# Patient Record
Sex: MALE | Race: WHITE | NOT HISPANIC OR LATINO | Employment: FULL TIME | RURAL
[De-identification: names, ages, dates, MRNs, and addresses within clinical notes are randomized per-mention and may not be internally consistent; named-entity substitution may affect disease eponyms.]

---

## 2020-09-30 ENCOUNTER — HISTORICAL (OUTPATIENT)
Dept: ADMINISTRATIVE | Facility: HOSPITAL | Age: 55
End: 2020-09-30

## 2020-10-01 LAB
LAB AP CLINICAL INFORMATION: NORMAL
LAB AP DIAGNOSIS - HISTORICAL: NORMAL
LAB AP GROSS PATHOLOGY - HISTORICAL: NORMAL
LAB AP SPECIMEN SUBMITTED - HISTORICAL: NORMAL

## 2020-10-02 LAB
INSULIN SERPL-ACNC: NORMAL U[IU]/ML

## 2020-11-18 ENCOUNTER — HISTORICAL (OUTPATIENT)
Dept: ADMINISTRATIVE | Facility: HOSPITAL | Age: 55
End: 2020-11-18

## 2021-05-07 ENCOUNTER — OFFICE VISIT (OUTPATIENT)
Dept: FAMILY MEDICINE | Facility: CLINIC | Age: 56
End: 2021-05-07
Payer: COMMERCIAL

## 2021-05-07 VITALS
TEMPERATURE: 99 F | SYSTOLIC BLOOD PRESSURE: 138 MMHG | RESPIRATION RATE: 20 BRPM | DIASTOLIC BLOOD PRESSURE: 98 MMHG | WEIGHT: 230.38 LBS | OXYGEN SATURATION: 98 % | BODY MASS INDEX: 32.25 KG/M2 | HEART RATE: 75 BPM | HEIGHT: 71 IN

## 2021-05-07 DIAGNOSIS — E78.5 DYSLIPIDEMIA: ICD-10-CM

## 2021-05-07 DIAGNOSIS — I10 ESSENTIAL HYPERTENSION: Primary | ICD-10-CM

## 2021-05-07 PROCEDURE — 99213 OFFICE O/P EST LOW 20 MIN: CPT | Mod: ,,, | Performed by: NURSE PRACTITIONER

## 2021-05-07 PROCEDURE — 99213 PR OFFICE/OUTPT VISIT, EST, LEVL III, 20-29 MIN: ICD-10-PCS | Mod: ,,, | Performed by: NURSE PRACTITIONER

## 2021-05-07 RX ORDER — ATORVASTATIN CALCIUM 10 MG/1
TABLET, FILM COATED ORAL
COMMUNITY
Start: 2021-03-26 | End: 2021-05-07 | Stop reason: SDUPTHER

## 2021-05-07 RX ORDER — CYCLOBENZAPRINE HCL 10 MG
10 TABLET ORAL 3 TIMES DAILY PRN
Qty: 90 TABLET | Refills: 3 | Status: SHIPPED | OUTPATIENT
Start: 2021-05-07 | End: 2021-09-08 | Stop reason: SDUPTHER

## 2021-05-07 RX ORDER — IBUPROFEN 800 MG/1
800 TABLET ORAL 3 TIMES DAILY
Qty: 60 TABLET | Refills: 3 | Status: SHIPPED | OUTPATIENT
Start: 2021-05-07 | End: 2021-09-08 | Stop reason: SDUPTHER

## 2021-05-07 RX ORDER — ATORVASTATIN CALCIUM 10 MG/1
TABLET, FILM COATED ORAL
Qty: 90 TABLET | Refills: 1 | Status: SHIPPED | OUTPATIENT
Start: 2021-05-07 | End: 2021-09-08 | Stop reason: SDUPTHER

## 2021-05-07 RX ORDER — LISINOPRIL AND HYDROCHLOROTHIAZIDE 12.5; 2 MG/1; MG/1
1 TABLET ORAL DAILY
COMMUNITY
Start: 2021-03-24 | End: 2021-05-07 | Stop reason: SDUPTHER

## 2021-05-07 RX ORDER — LISINOPRIL AND HYDROCHLOROTHIAZIDE 12.5; 2 MG/1; MG/1
1 TABLET ORAL DAILY
Qty: 90 TABLET | Refills: 1 | Status: SHIPPED | OUTPATIENT
Start: 2021-05-07 | End: 2021-09-08 | Stop reason: SDUPTHER

## 2021-05-07 RX ORDER — IBUPROFEN 800 MG/1
800 TABLET ORAL 3 TIMES DAILY
COMMUNITY
End: 2021-05-07 | Stop reason: SDUPTHER

## 2021-05-07 RX ORDER — CYCLOBENZAPRINE HCL 10 MG
10 TABLET ORAL 3 TIMES DAILY PRN
COMMUNITY
End: 2021-05-07 | Stop reason: SDUPTHER

## 2021-05-11 ENCOUNTER — TELEPHONE (OUTPATIENT)
Dept: FAMILY MEDICINE | Facility: CLINIC | Age: 56
End: 2021-05-11

## 2021-07-01 ENCOUNTER — PATIENT MESSAGE (OUTPATIENT)
Dept: ADMINISTRATIVE | Facility: OTHER | Age: 56
End: 2021-07-01

## 2021-09-08 ENCOUNTER — OFFICE VISIT (OUTPATIENT)
Dept: FAMILY MEDICINE | Facility: CLINIC | Age: 56
End: 2021-09-08
Payer: COMMERCIAL

## 2021-09-08 VITALS
WEIGHT: 230.63 LBS | OXYGEN SATURATION: 99 % | HEART RATE: 100 BPM | RESPIRATION RATE: 20 BRPM | TEMPERATURE: 98 F | DIASTOLIC BLOOD PRESSURE: 76 MMHG | BODY MASS INDEX: 32.29 KG/M2 | SYSTOLIC BLOOD PRESSURE: 128 MMHG | HEIGHT: 71 IN

## 2021-09-08 DIAGNOSIS — E78.2 MIXED HYPERLIPIDEMIA: ICD-10-CM

## 2021-09-08 DIAGNOSIS — E66.09 CLASS 1 OBESITY DUE TO EXCESS CALORIES WITH BODY MASS INDEX (BMI) OF 32.0 TO 32.9 IN ADULT, UNSPECIFIED WHETHER SERIOUS COMORBIDITY PRESENT: ICD-10-CM

## 2021-09-08 DIAGNOSIS — I10 ESSENTIAL HYPERTENSION: Primary | ICD-10-CM

## 2021-09-08 DIAGNOSIS — F17.210 NICOTINE DEPENDENCE, CIGARETTES, UNCOMPLICATED: ICD-10-CM

## 2021-09-08 PROBLEM — E66.811 CLASS 1 OBESITY DUE TO EXCESS CALORIES WITH BODY MASS INDEX (BMI) OF 32.0 TO 32.9 IN ADULT: Status: ACTIVE | Noted: 2021-09-08

## 2021-09-08 PROBLEM — Z86.0100 HISTORY OF COLON POLYPS: Status: ACTIVE | Noted: 2020-09-30

## 2021-09-08 PROBLEM — Z86.010 HISTORY OF COLON POLYPS: Status: ACTIVE | Noted: 2020-09-30

## 2021-09-08 PROCEDURE — 99214 PR OFFICE/OUTPT VISIT, EST, LEVL IV, 30-39 MIN: ICD-10-PCS | Mod: 25,,, | Performed by: NURSE PRACTITIONER

## 2021-09-08 PROCEDURE — 90471 PNEUMOCOCCAL POLYSACCHARIDE VACCINE 23-VALENT =>2YO SQ IM: ICD-10-PCS | Mod: ,,, | Performed by: NURSE PRACTITIONER

## 2021-09-08 PROCEDURE — 90732 PPSV23 VACC 2 YRS+ SUBQ/IM: CPT | Mod: ,,, | Performed by: NURSE PRACTITIONER

## 2021-09-08 PROCEDURE — 90471 IMMUNIZATION ADMIN: CPT | Mod: ,,, | Performed by: NURSE PRACTITIONER

## 2021-09-08 PROCEDURE — 99214 OFFICE O/P EST MOD 30 MIN: CPT | Mod: 25,,, | Performed by: NURSE PRACTITIONER

## 2021-09-08 PROCEDURE — 90732 PNEUMOCOCCAL POLYSACCHARIDE VACCINE 23-VALENT =>2YO SQ IM: ICD-10-PCS | Mod: ,,, | Performed by: NURSE PRACTITIONER

## 2021-09-08 RX ORDER — CYCLOBENZAPRINE HCL 10 MG
10 TABLET ORAL 3 TIMES DAILY PRN
Qty: 90 TABLET | Refills: 3 | Status: SHIPPED | OUTPATIENT
Start: 2021-09-08 | End: 2022-04-20 | Stop reason: SDUPTHER

## 2021-09-08 RX ORDER — PHENTERMINE HYDROCHLORIDE 37.5 MG/1
37.5 TABLET ORAL
Qty: 30 TABLET | Refills: 0 | Status: SHIPPED | OUTPATIENT
Start: 2021-09-08 | End: 2021-10-08

## 2021-09-08 RX ORDER — ATORVASTATIN CALCIUM 10 MG/1
TABLET, FILM COATED ORAL
Qty: 90 TABLET | Refills: 1 | Status: SHIPPED | OUTPATIENT
Start: 2021-09-08 | End: 2022-03-29 | Stop reason: SDUPTHER

## 2021-09-08 RX ORDER — LISINOPRIL AND HYDROCHLOROTHIAZIDE 12.5; 2 MG/1; MG/1
1 TABLET ORAL DAILY
Qty: 90 TABLET | Refills: 1 | Status: SHIPPED | OUTPATIENT
Start: 2021-09-08 | End: 2022-03-29 | Stop reason: SDUPTHER

## 2021-09-08 RX ORDER — IBUPROFEN 800 MG/1
800 TABLET ORAL 3 TIMES DAILY PRN
Qty: 90 TABLET | Refills: 3 | Status: SHIPPED | OUTPATIENT
Start: 2021-09-08 | End: 2022-04-20 | Stop reason: SDUPTHER

## 2021-10-09 ENCOUNTER — OFFICE VISIT (OUTPATIENT)
Dept: FAMILY MEDICINE | Facility: CLINIC | Age: 56
End: 2021-10-09
Payer: COMMERCIAL

## 2021-10-09 VITALS
HEART RATE: 98 BPM | BODY MASS INDEX: 31.66 KG/M2 | WEIGHT: 227 LBS | DIASTOLIC BLOOD PRESSURE: 80 MMHG | SYSTOLIC BLOOD PRESSURE: 130 MMHG | RESPIRATION RATE: 19 BRPM | TEMPERATURE: 98 F | OXYGEN SATURATION: 100 %

## 2021-10-09 DIAGNOSIS — B30.8 CHRONIC VIRAL CONJUNCTIVITIS OF LEFT EYE: Primary | ICD-10-CM

## 2021-10-09 PROCEDURE — 99051 PR MEDICAL SERVICES, EVE/WKEND/HOLIDAY: ICD-10-PCS | Mod: ,,, | Performed by: FAMILY MEDICINE

## 2021-10-09 PROCEDURE — 99213 PR OFFICE/OUTPT VISIT, EST, LEVL III, 20-29 MIN: ICD-10-PCS | Mod: ,,, | Performed by: FAMILY MEDICINE

## 2021-10-09 PROCEDURE — 99051 MED SERV EVE/WKEND/HOLIDAY: CPT | Mod: ,,, | Performed by: FAMILY MEDICINE

## 2021-10-09 PROCEDURE — 99213 OFFICE O/P EST LOW 20 MIN: CPT | Mod: ,,, | Performed by: FAMILY MEDICINE

## 2021-10-09 RX ORDER — ERYTHROMYCIN 5 MG/G
OINTMENT OPHTHALMIC EVERY 6 HOURS
Qty: 3.5 G | Refills: 1 | Status: SHIPPED | OUTPATIENT
Start: 2021-10-09 | End: 2022-03-29

## 2021-10-14 ENCOUNTER — OFFICE VISIT (OUTPATIENT)
Dept: FAMILY MEDICINE | Facility: CLINIC | Age: 56
End: 2021-10-14
Payer: COMMERCIAL

## 2021-10-14 VITALS
WEIGHT: 227.38 LBS | HEART RATE: 98 BPM | DIASTOLIC BLOOD PRESSURE: 86 MMHG | BODY MASS INDEX: 31.83 KG/M2 | OXYGEN SATURATION: 99 % | HEIGHT: 71 IN | TEMPERATURE: 98 F | SYSTOLIC BLOOD PRESSURE: 132 MMHG | RESPIRATION RATE: 20 BRPM

## 2021-10-14 DIAGNOSIS — Z79.899 OTHER LONG TERM (CURRENT) DRUG THERAPY: Primary | ICD-10-CM

## 2021-10-14 DIAGNOSIS — Z12.5 SCREENING FOR MALIGNANT NEOPLASM OF PROSTATE: ICD-10-CM

## 2021-10-14 DIAGNOSIS — I10 ESSENTIAL HYPERTENSION: ICD-10-CM

## 2021-10-14 DIAGNOSIS — Z11.59 NEED FOR HEPATITIS C SCREENING TEST: ICD-10-CM

## 2021-10-14 DIAGNOSIS — F17.210 NICOTINE DEPENDENCE, CIGARETTES, UNCOMPLICATED: ICD-10-CM

## 2021-10-14 DIAGNOSIS — E78.2 MIXED HYPERLIPIDEMIA: ICD-10-CM

## 2021-10-14 DIAGNOSIS — E66.09 CLASS 1 OBESITY DUE TO EXCESS CALORIES WITH SERIOUS COMORBIDITY AND BODY MASS INDEX (BMI) OF 32.0 TO 32.9 IN ADULT: Primary | ICD-10-CM

## 2021-10-14 DIAGNOSIS — H10.9 CONJUNCTIVITIS OF LEFT EYE, UNSPECIFIED CONJUNCTIVITIS TYPE: ICD-10-CM

## 2021-10-14 DIAGNOSIS — I10 PRIMARY HYPERTENSION: ICD-10-CM

## 2021-10-14 PROCEDURE — 99213 OFFICE O/P EST LOW 20 MIN: CPT | Mod: ,,, | Performed by: NURSE PRACTITIONER

## 2021-10-14 PROCEDURE — 99213 PR OFFICE/OUTPT VISIT, EST, LEVL III, 20-29 MIN: ICD-10-PCS | Mod: ,,, | Performed by: NURSE PRACTITIONER

## 2021-10-14 RX ORDER — PHENTERMINE HYDROCHLORIDE 37.5 MG/1
37.5 TABLET ORAL
Qty: 30 TABLET | Refills: 0 | Status: SHIPPED | OUTPATIENT
Start: 2021-10-14 | End: 2021-11-13

## 2021-11-17 ENCOUNTER — HOSPITAL ENCOUNTER (OUTPATIENT)
Dept: RADIOLOGY | Facility: HOSPITAL | Age: 56
Discharge: HOME | End: 2021-11-17
Attending: UROLOGY
Payer: COMMERCIAL

## 2021-11-17 ENCOUNTER — OFFICE VISIT (OUTPATIENT)
Dept: UROLOGY | Facility: CLINIC | Age: 56
End: 2021-11-17
Payer: COMMERCIAL

## 2021-11-17 VITALS
BODY MASS INDEX: 30.2 KG/M2 | HEIGHT: 72 IN | WEIGHT: 223 LBS | DIASTOLIC BLOOD PRESSURE: 95 MMHG | SYSTOLIC BLOOD PRESSURE: 136 MMHG | HEART RATE: 118 BPM

## 2021-11-17 DIAGNOSIS — N20.0 KIDNEY STONES: Primary | ICD-10-CM

## 2021-11-17 DIAGNOSIS — Z12.5 SCREENING FOR PROSTATE CANCER: ICD-10-CM

## 2021-11-17 DIAGNOSIS — N20.0 KIDNEY STONE: Primary | ICD-10-CM

## 2021-11-17 DIAGNOSIS — N20.0 KIDNEY STONE: ICD-10-CM

## 2021-11-17 PROCEDURE — 74018 RADEX ABDOMEN 1 VIEW: CPT | Mod: 26,,, | Performed by: RADIOLOGY

## 2021-11-17 PROCEDURE — 74018 XR KUB: ICD-10-PCS | Mod: 26,,, | Performed by: RADIOLOGY

## 2021-11-17 PROCEDURE — 99213 PR OFFICE/OUTPT VISIT, EST, LEVL III, 20-29 MIN: ICD-10-PCS | Mod: S$PBB,,, | Performed by: UROLOGY

## 2021-11-17 PROCEDURE — 99213 OFFICE O/P EST LOW 20 MIN: CPT | Mod: S$PBB,,, | Performed by: UROLOGY

## 2021-11-17 PROCEDURE — 99214 OFFICE O/P EST MOD 30 MIN: CPT | Mod: PBBFAC | Performed by: UROLOGY

## 2021-11-17 PROCEDURE — 74018 RADEX ABDOMEN 1 VIEW: CPT | Mod: TC

## 2021-12-10 ENCOUNTER — OFFICE VISIT (OUTPATIENT)
Dept: FAMILY MEDICINE | Facility: CLINIC | Age: 56
End: 2021-12-10
Payer: COMMERCIAL

## 2021-12-10 VITALS
TEMPERATURE: 98 F | RESPIRATION RATE: 20 BRPM | BODY MASS INDEX: 30.88 KG/M2 | DIASTOLIC BLOOD PRESSURE: 94 MMHG | HEIGHT: 72 IN | HEART RATE: 96 BPM | OXYGEN SATURATION: 97 % | SYSTOLIC BLOOD PRESSURE: 160 MMHG | WEIGHT: 228 LBS

## 2021-12-10 DIAGNOSIS — J01.00 ACUTE NON-RECURRENT MAXILLARY SINUSITIS: Primary | ICD-10-CM

## 2021-12-10 DIAGNOSIS — R53.83 FATIGUE, UNSPECIFIED TYPE: ICD-10-CM

## 2021-12-10 PROCEDURE — 96372 THER/PROPH/DIAG INJ SC/IM: CPT | Mod: ,,, | Performed by: NURSE PRACTITIONER

## 2021-12-10 PROCEDURE — 96372 PR INJECTION,THERAP/PROPH/DIAG2ST, IM OR SUBCUT: ICD-10-PCS | Mod: ,,, | Performed by: NURSE PRACTITIONER

## 2021-12-10 PROCEDURE — 99203 OFFICE O/P NEW LOW 30 MIN: CPT | Mod: 25,,, | Performed by: NURSE PRACTITIONER

## 2021-12-10 PROCEDURE — 99203 PR OFFICE/OUTPT VISIT, NEW, LEVL III, 30-44 MIN: ICD-10-PCS | Mod: 25,,, | Performed by: NURSE PRACTITIONER

## 2021-12-10 RX ORDER — DEXAMETHASONE SODIUM PHOSPHATE 4 MG/ML
8 INJECTION, SOLUTION INTRA-ARTICULAR; INTRALESIONAL; INTRAMUSCULAR; INTRAVENOUS; SOFT TISSUE ONCE
Status: COMPLETED | OUTPATIENT
Start: 2021-12-10 | End: 2021-12-10

## 2021-12-10 RX ORDER — CEFDINIR 300 MG/1
300 CAPSULE ORAL 2 TIMES DAILY
Qty: 20 CAPSULE | Refills: 0 | Status: SHIPPED | OUTPATIENT
Start: 2021-12-10 | End: 2021-12-20

## 2021-12-10 RX ORDER — PREDNISONE 20 MG/1
20 TABLET ORAL DAILY
Qty: 5 TABLET | Refills: 0 | Status: SHIPPED | OUTPATIENT
Start: 2021-12-10 | End: 2022-03-29 | Stop reason: ALTCHOICE

## 2021-12-10 RX ORDER — CYANOCOBALAMIN 1000 UG/ML
1000 INJECTION, SOLUTION INTRAMUSCULAR; SUBCUTANEOUS
Status: COMPLETED | OUTPATIENT
Start: 2021-12-10 | End: 2021-12-10

## 2021-12-10 RX ORDER — CEFTRIAXONE 1 G/1
1 INJECTION, POWDER, FOR SOLUTION INTRAMUSCULAR; INTRAVENOUS
Status: COMPLETED | OUTPATIENT
Start: 2021-12-10 | End: 2021-12-10

## 2021-12-10 RX ADMIN — CYANOCOBALAMIN 1000 MCG: 1000 INJECTION, SOLUTION INTRAMUSCULAR; SUBCUTANEOUS at 12:12

## 2021-12-10 RX ADMIN — DEXAMETHASONE SODIUM PHOSPHATE 8 MG: 4 INJECTION, SOLUTION INTRA-ARTICULAR; INTRALESIONAL; INTRAMUSCULAR; INTRAVENOUS; SOFT TISSUE at 12:12

## 2021-12-10 RX ADMIN — CEFTRIAXONE 1 G: 1 INJECTION, POWDER, FOR SOLUTION INTRAMUSCULAR; INTRAVENOUS at 12:12

## 2022-03-14 PROBLEM — J01.00 ACUTE NON-RECURRENT MAXILLARY SINUSITIS: Status: RESOLVED | Noted: 2021-12-10 | Resolved: 2022-03-14

## 2022-03-29 ENCOUNTER — TELEPHONE (OUTPATIENT)
Dept: FAMILY MEDICINE | Facility: CLINIC | Age: 57
End: 2022-03-29
Payer: COMMERCIAL

## 2022-03-29 DIAGNOSIS — E78.2 MIXED HYPERLIPIDEMIA: ICD-10-CM

## 2022-03-29 DIAGNOSIS — I10 ESSENTIAL HYPERTENSION: ICD-10-CM

## 2022-03-29 RX ORDER — ATORVASTATIN CALCIUM 10 MG/1
10 TABLET, FILM COATED ORAL NIGHTLY
Qty: 90 TABLET | Refills: 0 | Status: SHIPPED | OUTPATIENT
Start: 2022-03-29 | End: 2022-04-20 | Stop reason: SDUPTHER

## 2022-03-29 RX ORDER — LISINOPRIL AND HYDROCHLOROTHIAZIDE 12.5; 2 MG/1; MG/1
1 TABLET ORAL DAILY
Qty: 90 TABLET | Refills: 0 | Status: SHIPPED | OUTPATIENT
Start: 2022-03-29 | End: 2022-04-20 | Stop reason: SDUPTHER

## 2022-03-29 NOTE — TELEPHONE ENCOUNTER
----- Message from La Guzman sent at 3/29/2022  7:43 AM CDT -----  Patient needs a refill on blood pressure and cholesterol called into [facility]. Please call patient  if you have any questions. Thanks!

## 2022-03-29 NOTE — TELEPHONE ENCOUNTER
Let pt know 90 day supply of meds were sent but his appt needs to be moved up so that he does not run out before we see him.

## 2022-03-29 NOTE — TELEPHONE ENCOUNTER
Yes I told her to schedule him in about 2 mths. We are only giving him a 90 day rx so that will only last until the end of June.

## 2022-04-04 ENCOUNTER — CLINICAL SUPPORT (OUTPATIENT)
Dept: PRIMARY CARE CLINIC | Facility: CLINIC | Age: 57
End: 2022-04-04

## 2022-04-04 DIAGNOSIS — Z01.10 ENCOUNTER FOR HEARING EXAMINATION, UNSPECIFIED WHETHER ABNORMAL FINDINGS: Primary | ICD-10-CM

## 2022-04-04 PROCEDURE — 92552 PR PURE TONE AUDIOMETRY, AIR: ICD-10-PCS | Mod: ,,, | Performed by: NURSE PRACTITIONER

## 2022-04-04 PROCEDURE — 92552 PURE TONE AUDIOMETRY AIR: CPT | Mod: ,,, | Performed by: NURSE PRACTITIONER

## 2022-04-04 NOTE — PROGRESS NOTES
Subjective:       Patient ID: Eric Gold is a 56 y.o. male.    Chief Complaint: No chief complaint on file.    HPI  Review of Systems      Objective:      Physical Exam    Assessment:       Problem List Items Addressed This Visit    None     Visit Diagnoses     Encounter for hearing examination, unspecified whether abnormal findings    -  Primary          Plan:       See scanned documents in .

## 2022-04-19 ENCOUNTER — CLINICAL SUPPORT (OUTPATIENT)
Dept: PRIMARY CARE CLINIC | Facility: CLINIC | Age: 57
End: 2022-04-19

## 2022-04-19 DIAGNOSIS — Z01.10 ENCOUNTER FOR HEARING EXAMINATION, UNSPECIFIED WHETHER ABNORMAL FINDINGS: Primary | ICD-10-CM

## 2022-04-19 PROCEDURE — 92552 PR PURE TONE AUDIOMETRY, AIR: ICD-10-PCS | Mod: ,,, | Performed by: NURSE PRACTITIONER

## 2022-04-19 PROCEDURE — 92552 PURE TONE AUDIOMETRY AIR: CPT | Mod: ,,, | Performed by: NURSE PRACTITIONER

## 2022-04-20 ENCOUNTER — OFFICE VISIT (OUTPATIENT)
Dept: FAMILY MEDICINE | Facility: CLINIC | Age: 57
End: 2022-04-20
Payer: COMMERCIAL

## 2022-04-20 VITALS
SYSTOLIC BLOOD PRESSURE: 130 MMHG | BODY MASS INDEX: 29.9 KG/M2 | RESPIRATION RATE: 20 BRPM | HEART RATE: 97 BPM | WEIGHT: 225.63 LBS | HEIGHT: 73 IN | OXYGEN SATURATION: 98 % | TEMPERATURE: 98 F | DIASTOLIC BLOOD PRESSURE: 78 MMHG

## 2022-04-20 DIAGNOSIS — I10 ESSENTIAL HYPERTENSION: Primary | ICD-10-CM

## 2022-04-20 DIAGNOSIS — E78.2 MIXED HYPERLIPIDEMIA: ICD-10-CM

## 2022-04-20 DIAGNOSIS — R53.83 OTHER FATIGUE: ICD-10-CM

## 2022-04-20 PROBLEM — E66.811 CLASS 1 OBESITY DUE TO EXCESS CALORIES WITH BODY MASS INDEX (BMI) OF 32.0 TO 32.9 IN ADULT: Status: RESOLVED | Noted: 2021-09-08 | Resolved: 2022-04-20

## 2022-04-20 PROBLEM — E66.09 CLASS 1 OBESITY DUE TO EXCESS CALORIES WITH BODY MASS INDEX (BMI) OF 32.0 TO 32.9 IN ADULT: Status: RESOLVED | Noted: 2021-09-08 | Resolved: 2022-04-20

## 2022-04-20 PROBLEM — H10.9 CONJUNCTIVITIS OF LEFT EYE: Status: RESOLVED | Noted: 2021-10-14 | Resolved: 2022-04-20

## 2022-04-20 PROCEDURE — 99213 OFFICE O/P EST LOW 20 MIN: CPT | Mod: 25,,, | Performed by: NURSE PRACTITIONER

## 2022-04-20 PROCEDURE — 99213 PR OFFICE/OUTPT VISIT, EST, LEVL III, 20-29 MIN: ICD-10-PCS | Mod: 25,,, | Performed by: NURSE PRACTITIONER

## 2022-04-20 PROCEDURE — 96372 THER/PROPH/DIAG INJ SC/IM: CPT | Mod: ,,, | Performed by: NURSE PRACTITIONER

## 2022-04-20 PROCEDURE — 96372 PR INJECTION,THERAP/PROPH/DIAG2ST, IM OR SUBCUT: ICD-10-PCS | Mod: ,,, | Performed by: NURSE PRACTITIONER

## 2022-04-20 RX ORDER — LISINOPRIL AND HYDROCHLOROTHIAZIDE 12.5; 2 MG/1; MG/1
1 TABLET ORAL DAILY
Qty: 90 TABLET | Refills: 1 | Status: SHIPPED | OUTPATIENT
Start: 2022-04-20 | End: 2022-10-27 | Stop reason: SDUPTHER

## 2022-04-20 RX ORDER — ATORVASTATIN CALCIUM 10 MG/1
10 TABLET, FILM COATED ORAL NIGHTLY
Qty: 90 TABLET | Refills: 3 | Status: SHIPPED | OUTPATIENT
Start: 2022-04-20 | End: 2022-10-27 | Stop reason: SDUPTHER

## 2022-04-20 RX ORDER — CYANOCOBALAMIN 1000 UG/ML
1000 INJECTION, SOLUTION INTRAMUSCULAR; SUBCUTANEOUS
Status: COMPLETED | OUTPATIENT
Start: 2022-04-20 | End: 2022-04-20

## 2022-04-20 RX ORDER — CYCLOBENZAPRINE HCL 10 MG
10 TABLET ORAL 3 TIMES DAILY PRN
Qty: 90 TABLET | Refills: 1 | Status: SHIPPED | OUTPATIENT
Start: 2022-04-20 | End: 2022-10-27 | Stop reason: SDUPTHER

## 2022-04-20 RX ORDER — PHENTERMINE HYDROCHLORIDE 37.5 MG/1
37.5 TABLET ORAL
Qty: 30 TABLET | Refills: 0 | Status: SHIPPED | OUTPATIENT
Start: 2022-04-20 | End: 2022-05-20

## 2022-04-20 RX ORDER — IBUPROFEN 800 MG/1
800 TABLET ORAL 3 TIMES DAILY PRN
Qty: 90 TABLET | Refills: 1 | Status: SHIPPED | OUTPATIENT
Start: 2022-04-20 | End: 2022-10-27 | Stop reason: SDUPTHER

## 2022-04-20 RX ADMIN — CYANOCOBALAMIN 1000 MCG: 1000 INJECTION, SOLUTION INTRAMUSCULAR; SUBCUTANEOUS at 11:04

## 2022-04-20 NOTE — PATIENT INSTRUCTIONS
Patient Education       Smoking: Not Just Harmful to Your Lungs and Heart   About this topic   Smoking is very common at any age. It is one of the leading causes of poor health and illness. Smoking can lead to death. It has many harmful chemicals that are released by the tobacco you smoke and inhale. Tobacco has many forms. These are:  Cigarettes  Pipes  Cigars  Chewing tobacco  Electronic cigarettes  Loose  Hookah  All kinds of tobaccos contain many toxic substances. These are what make you sick from smoking.  Nicotine ? The main thing that makes you addicted to smoking  Carbon monoxide ? A poison that gets into your blood when smoking  Tar ? Has chemicals that are cancerous  Lead and many others  These factors affect how much damage smoking will have on you:  How much you smoke  What you smoke  How what you smoke has been prepared  The content of what you smoke  Smoking hurts every part of the body. The lungs and the heart are most often affected by tobacco use.  General   Smoking is very harmful to your body. It can cause many illnesses and can affect how you look.  Smoking and Cancer   Smoking is the most common cause of lung cancer. Smoking may also increase the risk of:  Mouth cancer. This can also be caused by chewing tobacco.  Bladder cancer  Cancer of the tube from the mouth to stomach. This is also called the esophagus.  Cancer of the throat. This can also be caused by chewing tobacco.  Kidney cancer  Liver cancer  Stomach, colon, and rectal cancer  Cancer of the pancreas  Cancer of the cervix in women  Cancer of the blood or leukemia  Skin cancer  Smoking and Your Lungs   If you smoke you are more likely to have:  Breathing problems  Lung infections like pneumonia or bronchitis  Lung disease such as COPD or emphysema  Asthma  Smoking and the Heart and Circulation   Causes heart disease and stroke  Smokers are at a higher risk for high blood pressure  You are more likely to get blood clots  Smoking can  lower blood flow to the legs and skin  Smoking and Diabetes   If you smoke, you:  Have a higher risk of developing diabetes  May have higher blood sugar levels  May have more problems with your diabetes  Smoking and Digestion   Smokers make more stomach acid. This can cause sores or ulcers in your belly or bowel.  Your stomach acids may flow back to your esophagus. This is also called heartburn.  You have more chance of bowel irritation and swelling  Smoking and Your Bones   If you smoke:  Your bone-forming cells don't grow as fast  Your bones may become weaker (osteoporosis)  You may have more low back pain and arthritis  You may have more overuse injuries  You may have a greater risk of breaking bones  Your broken bones may take longer to heal  Smoking and Pregnancy   Makes your baby more prone to problems  You have a higher chance of having a premature baby or baby born early  Your healthy baby may die without reason. This is called sudden infant death syndrome.  Your baby may be born dead (stillbirth)  Your baby may have a low birth weight  You have a higher risk of an ectopic pregnancy or a pregnancy outside of the uterus  You have a higher chance of having a baby with mouth or facial defects  Smoking and Your Eyes, Hair, Hands, and Mouth   If you smoke, you may notice your:  Eyes become cloudy and form cataracts  Hair may get thin and turn gray sooner than it should  Fingernails turn yellow  Teeth become yellowish brown  Gums have more problems  Teeth have problems or even become loose  Sense of taste and smell start to go away  Breath smells bad  Smoking and Skin   Smoking causes:  Less blood flow to the skin  Wrinkles start early around your eyes and mouth  Dry skin  Your skin to lose elasticity and strength  Skin may get splotchy or pale  Your face to look thin and old. This is called smoker's face.  Greater risk of getting a skin problem called psoriasis  Slower healing of cuts or bruises  Smoking and Sex  Life   If you smoke you are more likely to have problems like:  Impotence  Decreased blood flow to the penis. This is also called erectile dysfunction.  Trouble getting pregnant  Early change of life or menopause for women  A higher risk of heart attack or stroke for women who smoke and use birth control pills  Damaged sperm that may lead to problems getting a woman pregnant, birth defects, or miscarriage  Smoking and Your Brain and Behavior   Smoking can:  Affect your mood  Lead to addiction  Cause long-term confusion or damage to your brain cells  Cause low mood  Smoking and Children   If you smoke, your children:  Will be more likely to smoke.  Can be sick from being around your smoke. This is called secondhand smoke.  Need to learn about the bad effects of smoking. Most smokers start before the age of 18. Encourage your children never to smoke.  Smoking and Other Effects   Smoking can cause:  Wounds to take longer to heal  You to eat poorly  Weight loss  Swelling in the body and affect the body's immune or defense system  Rheumatoid arthritis  Greater chance of injury  You to get warts easier (human papillomavirus)  A bad odor in hair and on clothes  You to have poor sports performance  You to spend a lot of money. Smoking is an expensive habit. A smoker who smokes a pack per day in the [place] will spend over $2000 per year on cigarettes.  Health care to cost more  You to miss work more often  Hearing loss  Even if you have smoked for many years, it is not too late to quit. Your body starts to heal as soon as you stop smoking. It is better to quit when you are young, but you can still get healthier if you quit at any age.       Helpful tips   Some helpful steps you can take to help you quit smoking:  Set a date to quit smoking.  Know the reasons that make you smoke more.  Know why you want to quit smoking.  Write down each time you smoke. Include the time and what you are doing. Plan ahead about what you will do  instead of smoking when that time or event reappears.  Tell your family and friends about your plan to quit smoking. Let them know how to help you.  Slowly reduce your smoking.  Remove smoking and tobacco products from your home and other places.  Avoid places and situations where you will more likely smoke. If people close to you smoke, ask them to quit with you. If they do not quit, ask them to not smoke around you.  Reward or treat yourself every time you do not smoke. Do not use food as a reward.  Ask a doctor for help.  Talk with your doctor before you try an electronic cigarette.  Where can I learn more?   Centers for Disease Control and Prevention  http://www.cdc.gov/tobacco/data_statistics/fact_sheets/health_effects/effects_cig_smoking/#overview   Centers for Disease Control and Prevention  https://www.cdc.gov/tobacco/campaign/tips/quit-smoking/guide/quit-plan.html?s_cid=OSH_tips_D9400   KidsHealth  http://Zero9.org/teen/drug_alcohol/tobacco/smoking.html#   US Food and Drug Administration  https://www.fda.gov/TobaccoProducts/Labeling/ProductsIngredientsComponents/default.htm   Last Reviewed Date   2021-03-31  Consumer Information Use and Disclaimer   This information is not specific medical advice and does not replace information you receive from your health care provider. This is only a brief summary of general information. It does NOT include all information about conditions, illnesses, injuries, tests, procedures, treatments, therapies, discharge instructions or life-style choices that may apply to you. You must talk with your health care provider for complete information about your health and treatment options. This information should not be used to decide whether or not to accept your health care providers advice, instructions or recommendations. Only your health care provider has the knowledge and training to provide advice that is right for you.  Copyright   Copyright © 2021 [employer] and  "its affiliates and/or licensors. All rights reserved.  Patient Education       Quitting Smoking   The Basics   Written by the doctors and editors at [facility]   What are the benefits of quitting smoking? -- Quitting smoking can dramatically improve your health and help you live longer. It lowers your risk of heart disease, lung disease, kidney failure, infection, and cancer.   Quitting smoking can also lower your chances of getting osteoporosis, a condition that makes your bones weak. Plus, it can help your skin look younger and reduce the chances that you will have problems with sex.  Quitting is not easy for most people, and it can take several tries to completely quit. But help and support are available. Quitting smoking will improve your health no matter how old you are, even if you have smoked for a long time.   What should I do if I want to quit smoking? -- It's a good idea to start by talking with your doctor or nurse. It is possible to quit on your own, without help. But getting help greatly increases your chances of quitting successfully.  When you are ready to quit, you will make a plan to:  Set a quit date  Tell your family and friends that you plan to quit  Plan ahead for the challenges you will face, such as cigarette cravings  Remove cigarettes from your home, car, and work  How can my doctor or nurse help? -- Your doctor or nurse can give you advice on the best way to quit. They can also give you medicines to:  Reduce your craving for cigarettes  Reduce your "withdrawal" symptoms (symptoms that happen when you stop smoking)  Your doctor or nurse can also help you find a counselor to talk to. For most people who are trying to quit smoking, it works best to use both medicines and counseling.  You can also get help from a free phone line (2-669-QUIT-NOW or 1-589.356.9275) or go online to www.smokefree.gov.  What are the symptoms of withdrawal? -- When you stop smoking, you might have symptoms such " as:  Trouble sleeping  Feeling irritable, anxious, or restless  Getting frustrated or angry  Having trouble thinking clearly  These symptoms can be hard to deal with, which is why it can be so hard to quit. But medicines can help.  Some people who stop smoking become temporarily depressed. Some people need treatment for depression, such as counseling or medicines or both. People with depression might:  No longer enjoy or care about doing the things they used to like to do  Feel sad, down, hopeless, nervous, or cranky most of the day, almost every day  Lose or gain weight  Sleep too much or too little  Feel tired or like they have no energy  Feel guilty or like they are worth nothing  Forget things or feel confused  Move and speak more slowly than usual  Act restless or have trouble staying still  Think about death or suicide  If you think you might be depressed, tell your doctor or nurse right away. They can talk to you about your symptoms and recommend treatment if needed.  If you ever feel like you might hurt yourself, go straight to the nearest emergency department or call for an ambulance (in the [place] and [place], dial 9-1-1). You can also call your doctor or nurse and tell them it is an emergency, or reach the  National Suicide Prevention Lifeline at 1-360.488.9008 or www.suicidepreventionlifeline.org.  How does counseling work? -- A counselor can help you figure out:  What triggers you to want to smoke, and how to handle these situations  How to resist cravings  What you can do differently if you have tried to quit before  You can meet with a counselor in one-on-one sessions or as part of a group. There are other ways to get counseling, too, such as over the phone, through text messaging, or online.   How do medicines help you stop smoking? -- Different medicines work in different ways:  Nicotine replacement therapy - Nicotine is the main drug in cigarettes, and the reason they are addictive. These medicines  "reduce your body's craving for nicotine. They also help with withdrawal symptoms.  There are different forms of nicotine replacement, including skin patches, lozenges, gum, nasal sprays, and inhalers. Most can be bought without a prescription. Also, health insurance might cover some or all of the cost.  It often helps to use 2 forms of nicotine replacement. For example, you might wear a patch all the time, plus use gum or lozenges when you get a craving to smoke.  Varenicline - Varenicline (brand names: Chantix, Champix) is a prescription medicine that reduces withdrawal symptoms and cigarette cravings. Varenicline can increase the effects of alcohol in some people. It's a good idea to limit drinking while you're taking it, at least until you know how it affects you.  Even if you are not yet ready to commit to a quit date, varenicline can help reduce cravings. This can make it easier to quit when you are ready.  Bupropion - Bupropion (sample brand names: Wellbutrin, Zyban) is a prescription medicine that reduces your desire to smoke. It is also available in a generic version, which is cheaper than the brand name medicines. Doctors do not usually prescribe bupropion for people with seizures or who have had seizures in the past.  It might also be helpful to combine nicotine replacement with bupropion or varenicline. In some cases, a person might even take both bupropion and varenicline. Your doctor or nurse can help you figure out the best combination for you.  What about e-cigarettes? -- Sometimes people wonder if using electronic cigarettes, or "e-cigarettes," can help them quit smoking. Using e-cigarettes is also called "vaping." Doctors do not recommend e-cigarettes in place of medicines and counseling. That's because e-cigarettes still contain nicotine as well as other substances that might be harmful. It's also not clear how they can affect a person's health in the long term.  What if I am pregnant and I smoke? " -- If you are pregnant, it's really important for the health of your baby that you quit. Ask your doctor what options you have, and what is safest for your baby.  Will I gain weight if I quit? -- You might gain a few pounds. This can be frustrating for some people, but it's important to remember that you are improving your health by quitting smoking. You can help prevent gaining a lot of weight by staying active and eating a healthy diet.  What if I am not able to quit? -- If you don't quit on your first try, or if you quit but then start smoking again, don't give up hope. Lots of people have to try more than once before they are able to completely quit.  It might help to try to understand why quitting did not work. There might be something you can do differently when you try again. It can help to figure out what situations make you want to smoke, so you can avoid them.   What else can I do to improve my chances of quitting? -- You can:  Get regular exercise. Any type of physical activity, even gentle forms of movement, is good for your health. Physical activity can also help reduce stress.  Stay away from smokers and places that make you want to smoke. If people close to you smoke, ask them to quit with you or avoid smoking around you.  Carry gum, hard candy, or something to put in your mouth. If you get a craving for a cigarette, try one of these instead.  Don't give up, even if you start smoking again. It takes most people a few tries before they succeed.  All topics are updated as new evidence becomes available and our peer review process is complete.  This topic retrieved from [employer] on: Sep 21, 2021.  Topic 03082 Version 22.0  Release: 29.4.2 - C29.263  © 2021 UpToDate, Inc. and/or its affiliates. All rights reserved.  Consumer Information Use and Disclaimer   This information is not specific medical advice and does not replace information you receive from your health care provider. This is only a brief  "summary of general information. It does NOT include all information about conditions, illnesses, injuries, tests, procedures, treatments, therapies, discharge instructions or life-style choices that may apply to you. You must talk with your health care provider for complete information about your health and treatment options. This information should not be used to decide whether or not to accept your health care provider's advice, instructions or recommendations. Only your health care provider has the knowledge and training to provide advice that is right for you. The use of this information is governed by the [employer] End User License Agreement, available at https://www.TapZilla/en/solutions/[employer]/about/anna.The use of [employer] content is governed by the [employer] Terms of Use. ©2021 UpToDate, Inc. All rights reserved.  Copyright   © 2021 UpToDate, Inc. and/or its affiliates. All rights reserved.  Patient Education       High Blood Pressure in Adults   The Basics   Written by the doctors and editors at [facility]   What is high blood pressure? -- High blood pressure is a condition that puts you at risk for heart attack, stroke, and kidney disease. It does not usually cause symptoms. But it can be serious.  When your doctor or nurse tells you your blood pressure, they will say 2 numbers. For instance, your doctor or nurse might say that your blood pressure is "130 over 80." The top number is the pressure inside your arteries when your heart is sydnee. The bottom number is the pressure inside your arteries when your heart is relaxed.  "Elevated blood pressure" is a term doctors or nurses use as a warning. People with elevated blood pressure do not yet have high blood pressure. But their blood pressure is not as low as it should be for good health.  Many experts define high, elevated, and normal blood pressure as follows:  High - Top number of 130 or above and/or bottom number of 80 or above  Elevated - " Top number between 120 and 129 and bottom number of 79 or below  Normal - Top number of 119 or below and bottom number of 79 or below  This information is also in the table (table 1).   How can I lower my blood pressure? -- If your doctor or nurse has prescribed blood pressure medicine, the most important thing you can do is to take it. If it causes side effects, do not just stop taking it. Instead, talk to your doctor or nurse about the problems it causes. They might be able to lower your dose or switch you to another medicine. If cost is a problem, mention that too. They might be able to put you on a less expensive medicine. Taking your blood pressure medicine can keep you from having a heart attack or stroke, and it can save your life!  Can I do anything on my own? -- You have a lot of control over your blood pressure. To lower it:  Lose weight (if you are overweight)  Choose a diet low in fat and rich in fruits, vegetables, and low-fat dairy products  Reduce the amount of salt you eat  Do something active for at least 30 minutes a day on most days of the week  Cut down on alcohol (if you drink more than 2 alcoholic drinks per day)  It's also a good idea to get a home blood pressure meter. People who check their own blood pressure at home do better at keeping it low and can sometimes even reduce the amount of medicine they take.  All topics are updated as new evidence becomes available and our peer review process is complete.  This topic retrieved from [employer] on: Sep 21, 2021.  Topic 17448 Version 15.0  Release: 29.4.2 - C29.263  © 2021 UpToDate, Inc. and/or its affiliates. All rights reserved.  table 1: Definition of normal and high blood pressure  Level  Top number  Bottom number    High 130 or above 80 or above   Elevated 120 to 129 79 or below   Normal 119 or below 79 or below   These definitions are from the American College of Cardiology/American Heart Association. Other expert groups might use slightly  "different definitions.  "Elevated blood pressure" is a term doctor or nurses use as a warning. It means you do not yet have high blood pressure, but your blood pressure is not as low as it should be for good health.  Graphic 81808 Version 6.0  Consumer Information Use and Disclaimer   This information is not specific medical advice and does not replace information you receive from your health care provider. This is only a brief summary of general information. It does NOT include all information about conditions, illnesses, injuries, tests, procedures, treatments, therapies, discharge instructions or life-style choices that may apply to you. You must talk with your health care provider for complete information about your health and treatment options. This information should not be used to decide whether or not to accept your health care provider's advice, instructions or recommendations. Only your health care provider has the knowledge and training to provide advice that is right for you. The use of this information is governed by the [employer] End User License Agreement, available at https://www.[employer]/en/solutions/[employer]/about/anna.The use of [employer] content is governed by the [employer] Terms of Use. ©2021 UpToDate, Inc. All rights reserved.  Copyright   © 2021 UpToDate, Inc. and/or its affiliates. All rights reserved.  Patient Education       DASH Diet   About this topic   DASH stands for Dietary Approaches to Stop Hypertension. The DASH diet may help you lower blood pressure. It may also help keep you from getting high blood pressure. You will eat less fat and more fiber on the DASH diet.  This diet gives you more minerals that fight high blood pressure. Some nutrients in this diet are:  Potassium ? Acts to help you get rid of salt. This may help to lower blood pressure.  Calcium ? Makes blood vessels and muscles work the right way  Magnesium - Helps blood vessels relax  Fiber ? Helps you feel full. " It also helps digestion.  What will the results be?   The DASH diet may help you:  Lower your blood pressure and cholesterol  Lower your risk for cancer, heart disease, heart attack, and stroke. It may also lower your risk for heart failure, kidney stones, and diabetes.  Lose weight or keep a healthy weight  What lifestyle changes are needed?   Add regular exercise to get the most help from this diet.  Try to lower stress. Find ways to relax.  Stop smoking. Avoid secondhand smoke.  Limit alcohol intake.  What changes to diet are needed?   Know about poor eating habits. Then, you can fix them as you work with the program.  This diet encourages fruits and vegetables, whole grains, lean meats, healthy fats, and low-fat or fat-free dairy products.  This diet is lower in saturated fats, trans-fats, cholesterol, added sugars, and sodium.  Who should use this diet?   This eating plan is good for the whole family. It is also good for people with high blood pressure and those at risk for high blood pressure.  What foods are good to eat?   Grains: Try to eat 6 to 8 servings of whole grain, high fiber foods each day. These are bread, cereals, brown rice, or pasta.  Fruits and vegetables: Eat 4 to 5 servings each day. Try to pick many kinds and colors. Fresh or frozen are best. Look for low sodium or salt-free if you choose canned.  Dairy: Try to eat 2 to 3 servings of fat free and low fat milk products each day.  Lean meats, poultry, and seafood: Try to eat 6 servings or less of lean meats, poultry, and seafood each day. Try to choose more low fat or lean meats like chicken and turkey. Eat less red meat. Eat more fish instead.  Nuts, seeds, and legumes (dry beans and peas): Try to eat 4 to 5 servings each week. Try to pick nuts such as almonds and walnuts, sunflower seeds, peanut butter, soy beans, lentils, kidney beans, and split peas.  Fats and oils: Try to eat 2 to 3 servings of fats and oils each day. Eat good fats found  in fish, nuts, and avocados. Try using olive oil or vegetable oils such as canola oil. Other good oils to try are corn, safflower, sunflower, or soybean oils. Use low-sodium and low-fat salad dressing and mayonnaise.  Condiments: Pepper, herbs, spices, vinegar, lemon or lime juices are great for seasoning. Be careful to choose low-sodium or salt-free products if you use broths, soups, or soy sauce.  Sweets: Try to eat less than 5 servings each week. Choose low-fat and trans fat-free desserts. These are things like fruit flavored gelatin, sorbet, jelly beans, juan crackers, animal crackers, low-fat fig bars, and vanessa snaps. Eat fruit to satisfy your desire for sweets.     What foods should be limited or avoided?   Grains: Salted breads, rolls, crackers, quick breads, self-rising flours, biscuit mixes, regular bread crumbs, instant hot cereals, commercially-prepared rice, pasta, stuffing mixes  Fruits and vegetables: Commercially-prepared potatoes and vegetable mixes, regular canned vegetables and juices, vegetables frozen with sauce or pickled vegetables, processed fruits with salt or sodium  Milk: Whole milk, malted milk, chocolate milk, buttermilk, cheese, ice cream  Meats and beans: Smoked, cured, salted, or canned fish; meats or poultry such as conde, sausages, sardines; high-fat cuts of meat like beef, lamb, or pork; chicken with the skin on it  Fats: Cut back on solid fats like butter, lard, and margarine. Eat less food with high saturated fat, cholesterol and total fat.  Condiments and snacks: Salted and canned peas, beans, and olives; salted snack foods; fried foods; soda or other sweetened drinks  Sweets: High-fat baked goods such as muffins, donuts, pastries, commercial baked goods, candy bars  If you choose to drink alcohol, limit the amount you drink. Women should have 1 drink or less per day and men should have 2 drinks or less per day.  Helpful tips   Avoid eating canned vegetables and processed  foods. These have a lot of salt in them. Look for a low-salt or low-sodium choice.  Try baking or broiling instead of frying food.  Write down the foods you eat. This will help you track what you have eaten each week.  When you go to a grocery store, have a list or a meal plan. Do not shop when you are hungry to avoid cravings for foods.  Read food labels with care. They will show you how much is in a serving. The amount is given as a percentage of the total amount you need each day. Reading labels will help you make healthy food choices.       Avoid fast foods.  Talk to your doctor or dietitian to see if you need vitamin and mineral supplements to help you balance your diet.  Talk to a dietitian for help.  Where can I learn more?   [employer]  https://www.eatright.org/health/wellness/heart-and-cardiovascular-health/dash-diet-reducing-hypertension-through-diet-and-lifestyle   FamilyDoctor.org  http://familydoctor.org/familydoctor/en/prevention-wellness/food-nutrition/weight-loss/the-dash-diet-healthy-eating-to-control-your-blood-pressure.html   Last Reviewed Date   2021-03-15  Consumer Information Use and Disclaimer   This information is not specific medical advice and does not replace information you receive from your health care provider. This is only a brief summary of general information. It does NOT include all information about conditions, illnesses, injuries, tests, procedures, treatments, therapies, discharge instructions or life-style choices that may apply to you. You must talk with your health care provider for complete information about your health and treatment options. This information should not be used to decide whether or not to accept your health care providers advice, instructions or recommendations. Only your health care provider has the knowledge and training to provide advice that is right for you.  Copyright   Copyright © 2021 [employer] and its affiliates and/or  licensors. All rights reserved.

## 2022-04-20 NOTE — PROGRESS NOTES
[facility] - FAMILY MEDICINE       PATIENT NAME: Eric Gold   : 1965    AGE: 56 y.o. DATE: 2022    MRN: [id number]        Reason for Visit / Chief Complaint:  Follow-up, Hypertension, Medication Refill, and weight management (Want to restart diet pills.)     Subjective:     HPI:  Presents for f/u HTN.  Has tried adipex in the past with good results, last taken 6 mths ago (Sept and Oct 2021. Wants to try adipex x1 more month.    Pulled a muscle in his neck; requests refills of motrin and flexeril.    B12 shot helped fatigue and is asking for another injection today.    PHQ9 10/14/2021   Total Score 0       Review of Systems:     Review of Systems   Constitutional: Positive for fatigue. Negative for chills and fever.   HENT: Negative.    Eyes: Negative.    Respiratory: Negative.    Cardiovascular: Negative.    Gastrointestinal: Negative.    Genitourinary: Negative.    Musculoskeletal: Positive for arthralgias and neck pain.   Skin: Negative.    Neurological: Negative.    Psychiatric/Behavioral: Negative.        Allergies and Medications:     Review of patient's allergies indicates:  No Known Allergies     Current Outpatient Medications on File Prior to Visit   Medication Sig Dispense Refill    [DISCONTINUED] atorvastatin (LIPITOR) 10 MG tablet Take 1 tablet (10 mg total) by mouth every evening. 90 tablet 0    [DISCONTINUED] cyclobenzaprine (FLEXERIL) 10 MG tablet Take 1 tablet (10 mg total) by mouth 3 (three) times daily as needed for Muscle spasms. 90 tablet 3    [DISCONTINUED] ibuprofen (ADVIL,MOTRIN) 800 MG tablet Take 1 tablet (800 mg total) by mouth 3 (three) times daily as needed for Pain. Take with food. Avoid all other NSAIDs. 90 tablet 3    [DISCONTINUED] lisinopriL-hydrochlorothiazide (PRINZIDE,ZESTORETIC) 20-12.5 mg per tablet Take 1 tablet by mouth once daily. 90 tablet 0     No current facility-administered medications on file  prior to visit.       Labs:     Lipids:   Lab Results   Component Value Date    CHOL 164 10/14/2021     Lab Results   Component Value Date    HDL 40 10/14/2021     Lab Results   Component Value Date    LDLCALC 94 10/14/2021     Lab Results   Component Value Date    TRIG 148 10/14/2021     Lab Results   Component Value Date    CHOLHDL 4.1 10/14/2021      CMP:  Sodium   Date Value Ref Range Status   10/14/2021 141 136 - 145 mmol/L Final     Potassium   Date Value Ref Range Status   10/14/2021 4.0 3.5 - 5.1 mmol/L Final     Chloride   Date Value Ref Range Status   10/14/2021 107 98 - 107 mmol/L Final     CO2   Date Value Ref Range Status   10/14/2021 29 21 - 32 mmol/L Final     Glucose   Date Value Ref Range Status   10/14/2021 111 (H) 74 - 106 mg/dL Final     BUN   Date Value Ref Range Status   10/14/2021 18 7 - 18 mg/dL Final     Creatinine   Date Value Ref Range Status   10/14/2021 1.06 0.70 - 1.30 mg/dL Final     Calcium   Date Value Ref Range Status   10/14/2021 9.3 8.5 - 10.1 mg/dL Final     Total Protein   Date Value Ref Range Status   10/14/2021 7.8 6.4 - 8.2 g/dL Final     Albumin   Date Value Ref Range Status   10/14/2021 4.0 3.5 - 5.0 g/dL Final     Bilirubin, Total   Date Value Ref Range Status   10/14/2021 0.4 >0.0 - 1.2 mg/dL Final     Alk Phos   Date Value Ref Range Status   10/14/2021 102 45 - 115 U/L Final     AST   Date Value Ref Range Status   10/14/2021 37 15 - 37 U/L Final     ALT   Date Value Ref Range Status   10/14/2021 87 (H) 16 - 61 U/L Final     Anion Gap   Date Value Ref Range Status   10/14/2021 9 7 - 16 mmol/L Final     eGFR   Date Value Ref Range Status   10/14/2021 77 >=60 mL/min/1.73m² Final      CBC:  Lab Results   Component Value Date    WBC 7.29 10/14/2021    RBC 5.51 10/14/2021    HGB 16.6 10/14/2021    HCT 50.8 10/14/2021    MCV 92.2 10/14/2021    MCH 30.1 10/14/2021    MCHC 32.7 10/14/2021    RDW 13.3 10/14/2021     10/14/2021    MPV 10.0 10/14/2021    LYMPH 38.1 10/14/2021     LYMPH 2.78 10/14/2021    MONO 9.6 (H) 10/14/2021    EOS 0.17 10/14/2021    BASO 0.04 10/14/2021    EOSINOPHIL 2.3 10/14/2021    BASOPHIL 0.5 10/14/2021      Lab Results   Component Value Date    TSH 1.140 10/14/2021       Medical/Social/Family History:     Past Medical History:   Diagnosis Date    Anxiety     Class 1 obesity due to excess calories with body mass index (BMI) of 32.0 to 32.9 in adult 9/8/2021    History of colon polyps 09/30/2020    Dr. Segovia, repeat 5 years    Hyperlipidemia     Hypertension     Kidney stone     Nicotine dependence, cigarettes, uncomplicated       Social History     Tobacco Use   Smoking Status Current Every Day Smoker    Packs/day: 1.00    Years: 37.00    Pack years: 37.00    Types: Cigarettes   Smokeless Tobacco Never Used      Social History     Substance and Sexual Activity   Alcohol Use Yes       Family History   Problem Relation Age of Onset    Diabetes Mother     Cancer Father         colon    No Known Problems Brother     No Known Problems Son     No Known Problems Maternal Grandmother     No Known Problems Maternal Grandfather     No Known Problems Paternal Grandmother     No Known Problems Paternal Grandfather       Past Surgical History:   Procedure Laterality Date    APPENDECTOMY      COLONOSCOPY          Health Maintenance:     Immunization History   Administered Date(s) Administered    COVID-19, MRNA, LN-S, PF (Pfizer) (Purple Cap) 02/12/2021, 03/19/2021, 10/14/2021    Influenza 10/01/2020, 10/14/2021    Pneumococcal Polysaccharide - 23 Valent 09/08/2021    Tdap 02/18/2014      Health Maintenance Due   Topic Date Due    Hemoglobin A1c  Never done    Shingles Vaccine (1 of 2) Never done     Health Maintenance Topics with due status: Not Due       Topic Last Completion Date    TETANUS VACCINE 02/18/2014    Colorectal Cancer Screening 09/30/2020    Pneumococcal Vaccines (Age 0-64) 09/08/2021    PROSTATE-SPECIFIC ANTIGEN 10/14/2021    Lipid  "Panel 10/14/2021       Objective:      Wt Readings from Last 3 Encounters:   04/20/22 1046 102.3 kg (225 lb 9.6 oz)   12/10/21 1239 103.4 kg (228 lb)   11/17/21 1453 101.2 kg (223 lb)     Vitals:    04/20/22 1046   BP: 130/78   Pulse: 97   Resp: 20   Temp: 97.7 °F (36.5 °C)   SpO2: 98%   Weight: 102.3 kg (225 lb 9.6 oz)   Height: 6' 1" (1.854 m)     Body mass index is 29.76 kg/m².     Physical Exam:    Physical Exam  Vitals and nursing note reviewed.   Constitutional:       Appearance: Normal appearance.   HENT:      Head: Normocephalic.      Right Ear: Tympanic membrane, ear canal and external ear normal.      Left Ear: Tympanic membrane, ear canal and external ear normal.      Nose: Nose normal.      Mouth/Throat:      Mouth: Mucous membranes are moist.      Pharynx: Oropharynx is clear.   Eyes:      Conjunctiva/sclera: Conjunctivae normal.   Neck:      Thyroid: No thyromegaly or thyroid tenderness.      Trachea: Trachea normal.   Cardiovascular:      Rate and Rhythm: Normal rate and regular rhythm.      Pulses: Normal pulses.      Heart sounds: Normal heart sounds.   Pulmonary:      Effort: Pulmonary effort is normal.      Breath sounds: Normal breath sounds.   Musculoskeletal:      Cervical back: Neck supple.      Right lower leg: No edema.      Left lower leg: No edema.   Lymphadenopathy:      Cervical: No cervical adenopathy.   Skin:     General: Skin is warm and dry.   Neurological:      Mental Status: He is alert and oriented to person, place, and time.   Psychiatric:         Mood and Affect: Mood normal.         Behavior: Behavior normal.          Assessment:          ICD-10-CM ICD-9-CM   1. Essential hypertension  I10 401.9   2. Mixed hyperlipidemia  E78.2 272.2   3. Other fatigue  R53.83 780.79        Plan:       Essential hypertension  -     lisinopriL-hydrochlorothiazide (PRINZIDE,ZESTORETIC) 20-12.5 mg per tablet; Take 1 tablet by mouth once daily.  Dispense: 90 tablet; Refill: 1    Mixed " hyperlipidemia  -     atorvastatin (LIPITOR) 10 MG tablet; Take 1 tablet (10 mg total) by mouth every evening.  Dispense: 90 tablet; Refill: 3    Other fatigue  -     cyanocobalamin injection 1,000 mcg    Other orders  -     cyclobenzaprine (FLEXERIL) 10 MG tablet; Take 1 tablet (10 mg total) by mouth 3 (three) times daily as needed for Muscle spasms.  Dispense: 90 tablet; Refill: 1  -     ibuprofen (ADVIL,MOTRIN) 800 MG tablet; Take 1 tablet (800 mg total) by mouth 3 (three) times daily as needed for Pain. Take with food. Avoid all other NSAIDs.  Dispense: 90 tablet; Refill: 1  -     phentermine (ADIPEX-P) 37.5 mg tablet; Take 1 tablet (37.5 mg total) by mouth before breakfast.  Dispense: 30 tablet; Refill: 0        Current Outpatient Medications:     atorvastatin (LIPITOR) 10 MG tablet, Take 1 tablet (10 mg total) by mouth every evening., Disp: 90 tablet, Rfl: 3    cyclobenzaprine (FLEXERIL) 10 MG tablet, Take 1 tablet (10 mg total) by mouth 3 (three) times daily as needed for Muscle spasms., Disp: 90 tablet, Rfl: 1    ibuprofen (ADVIL,MOTRIN) 800 MG tablet, Take 1 tablet (800 mg total) by mouth 3 (three) times daily as needed for Pain. Take with food. Avoid all other NSAIDs., Disp: 90 tablet, Rfl: 1    lisinopriL-hydrochlorothiazide (PRINZIDE,ZESTORETIC) 20-12.5 mg per tablet, Take 1 tablet by mouth once daily., Disp: 90 tablet, Rfl: 1    phentermine (ADIPEX-P) 37.5 mg tablet, Take 1 tablet (37.5 mg total) by mouth before breakfast., Disp: 30 tablet, Rfl: 0  No current facility-administered medications for this visit.      New & refilled meds:  Requested Prescriptions     Signed Prescriptions Disp Refills    cyclobenzaprine (FLEXERIL) 10 MG tablet 90 tablet 1     Sig: Take 1 tablet (10 mg total) by mouth 3 (three) times daily as needed for Muscle spasms.    ibuprofen (ADVIL,MOTRIN) 800 MG tablet 90 tablet 1     Sig: Take 1 tablet (800 mg total) by mouth 3 (three) times daily as needed for Pain. Take with  food. Avoid all other NSAIDs.    lisinopriL-hydrochlorothiazide (PRINZIDE,ZESTORETIC) 20-12.5 mg per tablet 90 tablet 1     Sig: Take 1 tablet by mouth once daily.    atorvastatin (LIPITOR) 10 MG tablet 90 tablet 3     Sig: Take 1 tablet (10 mg total) by mouth every evening.    phentermine (ADIPEX-P) 37.5 mg tablet 30 tablet 0     Sig: Take 1 tablet (37.5 mg total) by mouth before breakfast.     Wants to try 1 more month of adipex;  without suspicious activity, report printed to scan in. Healthy diet and exercise.  B12 1 ml IM  Med refills  Declines LDCT lung cancer screening.  Discussed getting shingles vaccine at a local pharmacy.  Advised to quit smoking.    Return to clinic 6 mths for wellness/HTN with fasting labs    Future Appointments   Date Time Provider Department Center   10/27/2022  7:40 AM [facility]   11/28/2022  2:15 PM Kang Bejarano Jr., MD [facility] [place] [facility] [place]:  [facility] - FAMILY MEDICINE    Date of encounter: 4/20/22

## 2022-10-27 ENCOUNTER — OFFICE VISIT (OUTPATIENT)
Dept: FAMILY MEDICINE | Facility: CLINIC | Age: 57
End: 2022-10-27
Payer: COMMERCIAL

## 2022-10-27 VITALS
SYSTOLIC BLOOD PRESSURE: 124 MMHG | BODY MASS INDEX: 33.01 KG/M2 | HEART RATE: 87 BPM | WEIGHT: 235.81 LBS | HEIGHT: 71 IN | TEMPERATURE: 98 F | OXYGEN SATURATION: 97 % | DIASTOLIC BLOOD PRESSURE: 78 MMHG

## 2022-10-27 DIAGNOSIS — Z79.899 ENCOUNTER FOR LONG-TERM (CURRENT) USE OF OTHER MEDICATIONS: ICD-10-CM

## 2022-10-27 DIAGNOSIS — Z23 FLU VACCINE NEED: ICD-10-CM

## 2022-10-27 DIAGNOSIS — Z12.5 PROSTATE CANCER SCREENING: ICD-10-CM

## 2022-10-27 DIAGNOSIS — Z13.220 SCREENING FOR HYPERLIPIDEMIA: ICD-10-CM

## 2022-10-27 DIAGNOSIS — E78.00 HYPERCHOLESTEREMIA: Chronic | ICD-10-CM

## 2022-10-27 DIAGNOSIS — M54.50 CHRONIC BILATERAL LOW BACK PAIN WITHOUT SCIATICA: ICD-10-CM

## 2022-10-27 DIAGNOSIS — R53.83 FATIGUE, UNSPECIFIED TYPE: ICD-10-CM

## 2022-10-27 DIAGNOSIS — Z23 NEED FOR PNEUMOCOCCAL VACCINE: ICD-10-CM

## 2022-10-27 DIAGNOSIS — Z00.00 ROUTINE GENERAL MEDICAL EXAMINATION AT A HEALTH CARE FACILITY: Primary | ICD-10-CM

## 2022-10-27 DIAGNOSIS — I10 ESSENTIAL HYPERTENSION: Chronic | ICD-10-CM

## 2022-10-27 DIAGNOSIS — G89.29 CHRONIC BILATERAL LOW BACK PAIN WITHOUT SCIATICA: ICD-10-CM

## 2022-10-27 DIAGNOSIS — F17.210 NICOTINE DEPENDENCE, CIGARETTES, UNCOMPLICATED: Chronic | ICD-10-CM

## 2022-10-27 DIAGNOSIS — E66.9 OBESITY, CLASS I, BMI 30-34.9: ICD-10-CM

## 2022-10-27 DIAGNOSIS — R73.01 IMPAIRED FASTING GLUCOSE: ICD-10-CM

## 2022-10-27 PROBLEM — E66.811 OBESITY, CLASS I, BMI 30-34.9: Status: ACTIVE | Noted: 2022-10-27

## 2022-10-27 LAB
ALBUMIN SERPL BCP-MCNC: 3.9 G/DL (ref 3.5–5)
ALBUMIN/GLOB SERPL: 1.1 {RATIO}
ALP SERPL-CCNC: 93 U/L (ref 45–115)
ALT SERPL W P-5'-P-CCNC: 72 U/L (ref 16–61)
ANION GAP SERPL CALCULATED.3IONS-SCNC: 11 MMOL/L (ref 7–16)
AST SERPL W P-5'-P-CCNC: 30 U/L (ref 15–37)
BASOPHILS # BLD AUTO: 0.03 K/UL (ref 0–0.2)
BASOPHILS NFR BLD AUTO: 0.4 % (ref 0–1)
BILIRUB SERPL-MCNC: 0.5 MG/DL (ref ?–1.2)
BUN SERPL-MCNC: 12 MG/DL (ref 7–18)
BUN/CREAT SERPL: 12 (ref 6–20)
CALCIUM SERPL-MCNC: 8.9 MG/DL (ref 8.5–10.1)
CHLORIDE SERPL-SCNC: 106 MMOL/L (ref 98–107)
CHOLEST SERPL-MCNC: 160 MG/DL (ref 0–200)
CHOLEST/HDLC SERPL: 4.6 {RATIO}
CO2 SERPL-SCNC: 25 MMOL/L (ref 21–32)
CREAT SERPL-MCNC: 1.02 MG/DL (ref 0.7–1.3)
DIFFERENTIAL METHOD BLD: ABNORMAL
EGFR (NO RACE VARIABLE) (RUSH/TITUS): 85 ML/MIN/1.73M²
EOSINOPHIL # BLD AUTO: 0.11 K/UL (ref 0–0.5)
EOSINOPHIL NFR BLD AUTO: 1.5 % (ref 1–4)
ERYTHROCYTE [DISTWIDTH] IN BLOOD BY AUTOMATED COUNT: 12.9 % (ref 11.5–14.5)
EST. AVERAGE GLUCOSE BLD GHB EST-MCNC: 110 MG/DL
GLOBULIN SER-MCNC: 3.5 G/DL (ref 2–4)
GLUCOSE SERPL-MCNC: 104 MG/DL (ref 74–106)
HBA1C MFR BLD HPLC: 5.9 % (ref 4.5–6.6)
HCT VFR BLD AUTO: 50 % (ref 40–54)
HDLC SERPL-MCNC: 35 MG/DL (ref 40–60)
HGB BLD-MCNC: 16.9 G/DL (ref 13.5–18)
IMM GRANULOCYTES # BLD AUTO: 0.02 K/UL (ref 0–0.04)
IMM GRANULOCYTES NFR BLD: 0.3 % (ref 0–0.4)
LDLC SERPL CALC-MCNC: 90 MG/DL
LDLC/HDLC SERPL: 2.6 {RATIO}
LYMPHOCYTES # BLD AUTO: 2.64 K/UL (ref 1–4.8)
LYMPHOCYTES NFR BLD AUTO: 35.2 % (ref 27–41)
MCH RBC QN AUTO: 29.4 PG (ref 27–31)
MCHC RBC AUTO-ENTMCNC: 33.8 G/DL (ref 32–36)
MCV RBC AUTO: 87.1 FL (ref 80–96)
MONOCYTES # BLD AUTO: 0.59 K/UL (ref 0–0.8)
MONOCYTES NFR BLD AUTO: 7.9 % (ref 2–6)
MPC BLD CALC-MCNC: 10.1 FL (ref 9.4–12.4)
NEUTROPHILS # BLD AUTO: 4.1 K/UL (ref 1.8–7.7)
NEUTROPHILS NFR BLD AUTO: 54.7 % (ref 53–65)
NONHDLC SERPL-MCNC: 125 MG/DL
NRBC # BLD AUTO: 0 X10E3/UL
NRBC, AUTO (.00): 0 %
PLATELET # BLD AUTO: 311 K/UL (ref 150–400)
POTASSIUM SERPL-SCNC: 4.2 MMOL/L (ref 3.5–5.1)
PROT SERPL-MCNC: 7.4 G/DL (ref 6.4–8.2)
PSA SERPL-MCNC: 1.19 NG/ML
RBC # BLD AUTO: 5.74 M/UL (ref 4.6–6.2)
SODIUM SERPL-SCNC: 138 MMOL/L (ref 136–145)
TRIGL SERPL-MCNC: 173 MG/DL (ref 35–150)
TSH SERPL DL<=0.005 MIU/L-ACNC: 1.2 UIU/ML (ref 0.36–3.74)
VIT B12 SERPL-MCNC: 736 PG/ML (ref 193–986)
VLDLC SERPL-MCNC: 35 MG/DL
WBC # BLD AUTO: 7.49 K/UL (ref 4.5–11)

## 2022-10-27 PROCEDURE — 99396 PREV VISIT EST AGE 40-64: CPT | Mod: 25,,, | Performed by: NURSE PRACTITIONER

## 2022-10-27 PROCEDURE — 82607 VITAMIN B12: ICD-10-PCS | Mod: ,,, | Performed by: CLINICAL MEDICAL LABORATORY

## 2022-10-27 PROCEDURE — 83036 HEMOGLOBIN A1C: ICD-10-PCS | Mod: ,,, | Performed by: CLINICAL MEDICAL LABORATORY

## 2022-10-27 PROCEDURE — 90677 PNEUMOCOCCAL CONJUGATE VACCINE 20-VALENT: ICD-10-PCS | Mod: ,,, | Performed by: NURSE PRACTITIONER

## 2022-10-27 PROCEDURE — 99396 PR PREVENTIVE VISIT,EST,40-64: ICD-10-PCS | Mod: 25,,, | Performed by: NURSE PRACTITIONER

## 2022-10-27 PROCEDURE — 96372 PR INJECTION,THERAP/PROPH/DIAG2ST, IM OR SUBCUT: ICD-10-PCS | Mod: 59,,, | Performed by: NURSE PRACTITIONER

## 2022-10-27 PROCEDURE — 90472 IMMUNIZATION ADMIN EACH ADD: CPT | Mod: ,,, | Performed by: NURSE PRACTITIONER

## 2022-10-27 PROCEDURE — 80050 GENERAL HEALTH PANEL: CPT | Mod: ,,, | Performed by: CLINICAL MEDICAL LABORATORY

## 2022-10-27 PROCEDURE — 90471 FLU VACCINE (QUAD) GREATER THAN OR EQUAL TO 3YO PRESERVATIVE FREE IM: ICD-10-PCS | Mod: ,,, | Performed by: NURSE PRACTITIONER

## 2022-10-27 PROCEDURE — G0103 PSA SCREENING: HCPCS | Mod: ,,, | Performed by: CLINICAL MEDICAL LABORATORY

## 2022-10-27 PROCEDURE — 82607 VITAMIN B-12: CPT | Mod: ,,, | Performed by: CLINICAL MEDICAL LABORATORY

## 2022-10-27 PROCEDURE — 80061 LIPID PANEL: CPT | Mod: ,,, | Performed by: CLINICAL MEDICAL LABORATORY

## 2022-10-27 PROCEDURE — 90471 IMMUNIZATION ADMIN: CPT | Mod: ,,, | Performed by: NURSE PRACTITIONER

## 2022-10-27 PROCEDURE — G0103 PSA, SCREENING: ICD-10-PCS | Mod: ,,, | Performed by: CLINICAL MEDICAL LABORATORY

## 2022-10-27 PROCEDURE — 83036 HEMOGLOBIN GLYCOSYLATED A1C: CPT | Mod: ,,, | Performed by: CLINICAL MEDICAL LABORATORY

## 2022-10-27 PROCEDURE — 90677 PCV20 VACCINE IM: CPT | Mod: ,,, | Performed by: NURSE PRACTITIONER

## 2022-10-27 PROCEDURE — 80061 LIPID PANEL: ICD-10-PCS | Mod: ,,, | Performed by: CLINICAL MEDICAL LABORATORY

## 2022-10-27 PROCEDURE — 90472 PNEUMOCOCCAL CONJUGATE VACCINE 20-VALENT: ICD-10-PCS | Mod: ,,, | Performed by: NURSE PRACTITIONER

## 2022-10-27 PROCEDURE — 80050 PR  GENERAL HEALTH PANEL: ICD-10-PCS | Mod: ,,, | Performed by: CLINICAL MEDICAL LABORATORY

## 2022-10-27 PROCEDURE — 96372 THER/PROPH/DIAG INJ SC/IM: CPT | Mod: 59,,, | Performed by: NURSE PRACTITIONER

## 2022-10-27 PROCEDURE — 90686 IIV4 VACC NO PRSV 0.5 ML IM: CPT | Mod: ,,, | Performed by: NURSE PRACTITIONER

## 2022-10-27 PROCEDURE — 90686 FLU VACCINE (QUAD) GREATER THAN OR EQUAL TO 3YO PRESERVATIVE FREE IM: ICD-10-PCS | Mod: ,,, | Performed by: NURSE PRACTITIONER

## 2022-10-27 RX ORDER — ATORVASTATIN CALCIUM 10 MG/1
10 TABLET, FILM COATED ORAL NIGHTLY
Qty: 90 TABLET | Refills: 3 | Status: SHIPPED | OUTPATIENT
Start: 2022-10-27 | End: 2023-06-07 | Stop reason: SDUPTHER

## 2022-10-27 RX ORDER — CYANOCOBALAMIN 1000 UG/ML
1000 INJECTION, SOLUTION INTRAMUSCULAR; SUBCUTANEOUS
Status: COMPLETED | OUTPATIENT
Start: 2022-10-27 | End: 2022-10-27

## 2022-10-27 RX ORDER — CYCLOBENZAPRINE HCL 10 MG
10 TABLET ORAL 3 TIMES DAILY PRN
Qty: 90 TABLET | Refills: 1 | Status: SHIPPED | OUTPATIENT
Start: 2022-10-27 | End: 2023-06-07 | Stop reason: SDUPTHER

## 2022-10-27 RX ORDER — PHENTERMINE HYDROCHLORIDE 37.5 MG/1
37.5 TABLET ORAL
Qty: 30 TABLET | Refills: 0 | Status: SHIPPED | OUTPATIENT
Start: 2022-10-27 | End: 2022-11-26

## 2022-10-27 RX ORDER — IBUPROFEN 800 MG/1
800 TABLET ORAL 3 TIMES DAILY PRN
Qty: 90 TABLET | Refills: 1 | Status: SHIPPED | OUTPATIENT
Start: 2022-10-27 | End: 2023-06-07 | Stop reason: SDUPTHER

## 2022-10-27 RX ORDER — LISINOPRIL AND HYDROCHLOROTHIAZIDE 12.5; 2 MG/1; MG/1
1 TABLET ORAL DAILY
Qty: 90 TABLET | Refills: 1 | Status: SHIPPED | OUTPATIENT
Start: 2022-10-27 | End: 2023-06-07 | Stop reason: SDUPTHER

## 2022-10-27 RX ADMIN — CYANOCOBALAMIN 1000 MCG: 1000 INJECTION, SOLUTION INTRAMUSCULAR; SUBCUTANEOUS at 09:10

## 2022-10-27 NOTE — PROGRESS NOTES
"[facility] FAMILY MEDICINE       PATIENT NAME: Eric Gold   : 1965    AGE: 57 y.o. DATE OF ENCOUNTER: 10/27/22    MRN: [id number]      PCP: BUSTER Pratt    Reason for Visit / Chief Complaint:  Hyperlipidemia and Hypertension (6 mth f/u)     Subjective:     HPI:    Presents for wellness visit and 6 mth f/u HTN & HLD.    Requests B12 IM - helps fatigue.  Requests adipex x1 mth again, has good results & reports lost wt but regained, "cooking too much lately". Per  in past 365 days only filled adipex x1.    Answers submitted by the patient for this visit:  High Blood Pressure Questionnaire (Submitted on 10/24/2022)  Chief Complaint: Hypertension  Chronicity: chronic  Onset: more than 1 year ago  Progression since onset: gradually improving  Condition status: controlled  anxiety: No  blurred vision: No  malaise/fatigue: No  orthopnea: No  peripheral edema: No  PND: No  sweats: No  Agents associated with hypertension: no associated agents  CAD risks: stress  Compliance problems: no compliance problems  Improvement on treatment: moderate      Review of Systems:     Review of Systems   Constitutional: Negative.  Unexpected weight change: wt gain.   Respiratory: Negative.  Negative for shortness of breath.    Cardiovascular: Negative.  Negative for chest pain and palpitations.   Gastrointestinal: Negative.    Genitourinary: Negative.    Musculoskeletal:  Negative for neck pain.        Intermittent neck & back pain - no problems right now; likes to keep ibuprofen & flexeril on hand.   Skin: Negative.    Neurological: Negative.  Negative for headaches.     Allergies:     Review of patient's allergies indicates:  No Known Allergies     Labs:     Lipids:   Lab Results   Component Value Date    CHOL 160 10/27/2022     Lab Results   Component Value Date    HDL 35 (L) 10/27/2022     Lab Results   Component Value Date    LDLCALC 90 10/27/2022     Lab Results   Component Value Date    " TRIG 173 (H) 10/27/2022     CMP:  Sodium   Date Value Ref Range Status   10/27/2022 138 136 - 145 mmol/L Final     Potassium   Date Value Ref Range Status   10/27/2022 4.2 3.5 - 5.1 mmol/L Final     Chloride   Date Value Ref Range Status   10/27/2022 106 98 - 107 mmol/L Final     Glucose   Date Value Ref Range Status   10/27/2022 104 74 - 106 mg/dL Final     BUN   Date Value Ref Range Status   10/27/2022 12 7 - 18 mg/dL Final     Creatinine   Date Value Ref Range Status   10/27/2022 1.02 0.70 - 1.30 mg/dL Final     AST   Date Value Ref Range Status   10/27/2022 30 15 - 37 U/L Final     ALT   Date Value Ref Range Status   10/27/2022 72 (H) 16 - 61 U/L Final     eGFR   Date Value Ref Range Status   10/14/2021 77 >=60 mL/min/1.73m² Final      CBC:  Lab Results   Component Value Date    WBC 7.49 10/27/2022    RBC 5.74 10/27/2022    HGB 16.9 10/27/2022    HCT 50.0 10/27/2022     10/27/2022      TSH:  Lab Results   Component Value Date    TSH 1.200 10/27/2022       Medical History:     Past Medical History:   Diagnosis Date    Anxiety     Class 1 obesity due to excess calories with body mass index (BMI) of 32.0 to 32.9 in adult 9/8/2021    History of colon polyps 09/30/2020    Dr. Segovia, repeat 5 years    Hyperlipidemia     Hypertension     Kidney stone     Nicotine dependence, cigarettes, uncomplicated       Social History     Tobacco Use   Smoking Status Every Day    Packs/day: 1.00    Years: 37.00    Pack years: 37.00    Types: Cigarettes   Smokeless Tobacco Never      Past Surgical History:   Procedure Laterality Date    APPENDECTOMY      COLONOSCOPY        Objective:      Wt Readings from Last 3 Encounters:   10/27/22 0758 107 kg (235 lb 12.8 oz)   04/20/22 1046 102.3 kg (225 lb 9.6 oz)   12/10/21 1239 103.4 kg (228 lb)     Vitals:    10/27/22 0758   BP: 124/78   BP Location: Left arm   Patient Position: Sitting   BP Method: Large (Manual)   Pulse: 87   Temp: 97.9 °F (36.6 °C)   TempSrc: Oral   SpO2: 97%  "  Weight: 107 kg (235 lb 12.8 oz)   Height: 5' 11" (1.803 m)     Body mass index is 32.89 kg/m².     Physical Exam:    Physical Exam  Vitals and nursing note reviewed.   Constitutional:       General: He is not in acute distress.     Appearance: Normal appearance. He is obese.   HENT:      Head: Normocephalic.      Right Ear: Tympanic membrane, ear canal and external ear normal.      Left Ear: Tympanic membrane, ear canal and external ear normal.      Nose: Nose normal.      Mouth/Throat:      Mouth: Mucous membranes are moist.      Pharynx: Oropharynx is clear.   Eyes:      Conjunctiva/sclera: Conjunctivae normal.      Pupils: Pupils are equal, round, and reactive to light.   Neck:      Thyroid: No thyromegaly.      Vascular: Normal carotid pulses. No carotid bruit.   Cardiovascular:      Rate and Rhythm: Normal rate and regular rhythm.      Pulses: Normal pulses.      Heart sounds: Normal heart sounds.   Pulmonary:      Effort: Pulmonary effort is normal. No respiratory distress.      Breath sounds: Normal breath sounds.   Abdominal:      Palpations: Abdomen is soft.      Tenderness: There is no abdominal tenderness.   Musculoskeletal:      Cervical back: Neck supple.      Right lower leg: No edema.      Left lower leg: No edema.   Lymphadenopathy:      Cervical: No cervical adenopathy.   Skin:     General: Skin is warm and dry.   Neurological:      General: No focal deficit present.      Mental Status: He is alert and oriented to person, place, and time.   Psychiatric:         Mood and Affect: Mood normal.         Behavior: Behavior normal.        Assessment:          ICD-10-CM ICD-9-CM   1. Routine general medical examination at a health care facility  Z00.00 V70.0   2. Screening for hyperlipidemia  Z13.220 V77.91   3. Prostate cancer screening  Z12.5 V76.44   4. Essential hypertension  I10 401.9   5. Hypercholesteremia  E78.00 272.0   6. Nicotine dependence, cigarettes, uncomplicated  F17.210 305.1   7. " Encounter for long-term (current) use of other medications  Z79.899 V58.69   8. Impaired fasting glucose  R73.01 790.21   9. Flu vaccine need  Z23 V04.81   10. Need for pneumococcal vaccine  Z23 V03.82   11. Fatigue, unspecified type  R53.83 780.79   12. Obesity, Class I, BMI 30-34.9  E66.9 278.00   13. Chronic bilateral low back pain without sciatica  M54.50 724.2    G89.29 338.29        Plan:       Routine general medical examination at a health care facility    Screening for hyperlipidemia  -     Lipid Panel; Future; Expected date: 10/27/2022    Prostate cancer screening  -     PSA, Screening; Future; Expected date: 10/27/2022    Essential hypertension  -     Comprehensive Metabolic Panel; Future; Expected date: 10/27/2022  -     lisinopriL-hydrochlorothiazide (PRINZIDE,ZESTORETIC) 20-12.5 mg per tablet; Take 1 tablet by mouth once daily.  Dispense: 90 tablet; Refill: 1    Hypercholesteremia  -     Comprehensive Metabolic Panel; Future; Expected date: 10/27/2022  -     atorvastatin (LIPITOR) 10 MG tablet; Take 1 tablet (10 mg total) by mouth every evening.  Dispense: 90 tablet; Refill: 3    Nicotine dependence, cigarettes, uncomplicated    Encounter for long-term (current) use of other medications  -     CBC Auto Differential; Future; Expected date: 10/27/2022  -     Comprehensive Metabolic Panel; Future; Expected date: 10/27/2022  -     Hemoglobin A1C; Future; Expected date: 10/27/2022  -     TSH; Future; Expected date: 10/27/2022  -     Vitamin B12; Future; Expected date: 10/27/2022    Impaired fasting glucose  -     Hemoglobin A1C; Future; Expected date: 10/27/2022    Flu vaccine need  -     Influenza - Quadrivalent (PF)    Need for pneumococcal vaccine  -     Pneumococcal Conjugate Vaccine (20 Valent) (IM)    Fatigue, unspecified type  -     cyanocobalamin injection 1,000 mcg    Obesity, Class I, BMI 30-34.9  -     phentermine (ADIPEX-P) 37.5 mg tablet; Take 1 tablet (37.5 mg total) by mouth before breakfast.   Dispense: 30 tablet; Refill: 0    Chronic bilateral low back pain without sciatica  -     ibuprofen (ADVIL,MOTRIN) 800 MG tablet; Take 1 tablet (800 mg total) by mouth 3 (three) times daily as needed for Pain. Take with food. Avoid all other NSAIDs.  Dispense: 90 tablet; Refill: 1  -     cyclobenzaprine (FLEXERIL) 10 MG tablet; Take 1 tablet (10 mg total) by mouth 3 (three) times daily as needed for Muscle spasms.  Dispense: 90 tablet; Refill: 1      Current Outpatient Medications:     atorvastatin (LIPITOR) 10 MG tablet, Take 1 tablet (10 mg total) by mouth every evening., Disp: 90 tablet, Rfl: 3    cyclobenzaprine (FLEXERIL) 10 MG tablet, Take 1 tablet (10 mg total) by mouth 3 (three) times daily as needed for Muscle spasms., Disp: 90 tablet, Rfl: 1    ibuprofen (ADVIL,MOTRIN) 800 MG tablet, Take 1 tablet (800 mg total) by mouth 3 (three) times daily as needed for Pain. Take with food. Avoid all other NSAIDs., Disp: 90 tablet, Rfl: 1    lisinopriL-hydrochlorothiazide (PRINZIDE,ZESTORETIC) 20-12.5 mg per tablet, Take 1 tablet by mouth once daily., Disp: 90 tablet, Rfl: 1    phentermine (ADIPEX-P) 37.5 mg tablet, Take 1 tablet (37.5 mg total) by mouth before breakfast., Disp: 30 tablet, Rfl: 0  No current facility-administered medications for this visit.    New & refilled meds:  Requested Prescriptions     Signed Prescriptions Disp Refills    lisinopriL-hydrochlorothiazide (PRINZIDE,ZESTORETIC) 20-12.5 mg per tablet 90 tablet 1     Sig: Take 1 tablet by mouth once daily.    atorvastatin (LIPITOR) 10 MG tablet 90 tablet 3     Sig: Take 1 tablet (10 mg total) by mouth every evening.    ibuprofen (ADVIL,MOTRIN) 800 MG tablet 90 tablet 1     Sig: Take 1 tablet (800 mg total) by mouth 3 (three) times daily as needed for Pain. Take with food. Avoid all other NSAIDs.    cyclobenzaprine (FLEXERIL) 10 MG tablet 90 tablet 1     Sig: Take 1 tablet (10 mg total) by mouth 3 (three) times daily as needed for Muscle spasms.     phentermine (ADIPEX-P) 37.5 mg tablet 30 tablet 0     Sig: Take 1 tablet (37.5 mg total) by mouth before breakfast.     Prevnar-20 & flu shot today; will RTC in a few wks for Shingrix #1  Can check on covid booster at local pharmacy since unknown when we will get the new booster in.    Check B12 level w/ labs today; B12 1 ml IM after labs today then will notify if needs further tx.    Wants to try 1 more month of adipex;  without suspicious activity.  Current treatment plan is effective, no change in therapy.  Lab results and schedule of future lab studies reviewed with patient.  Reviewed diet, exercise and weight control.  Declines LDCT.    Return to clinic 6 mths for HTN, no labs planned; and f/u as needed.    Future Appointments   Date Time Provider Department Center   11/28/2022  2:15 PM Kang Bejarano Jr., MD [facility] [place] [place]   5/10/2023  7:20 AM [facility] [place]        Signature:  BUSTER Pratt

## 2022-12-22 ENCOUNTER — OFFICE VISIT (OUTPATIENT)
Dept: FAMILY MEDICINE | Facility: CLINIC | Age: 57
End: 2022-12-22
Payer: COMMERCIAL

## 2022-12-22 VITALS
RESPIRATION RATE: 20 BRPM | WEIGHT: 233 LBS | HEART RATE: 83 BPM | SYSTOLIC BLOOD PRESSURE: 150 MMHG | DIASTOLIC BLOOD PRESSURE: 93 MMHG | TEMPERATURE: 99 F | HEIGHT: 72 IN | BODY MASS INDEX: 31.56 KG/M2 | OXYGEN SATURATION: 97 %

## 2022-12-22 DIAGNOSIS — J10.1 INFLUENZA A: Primary | ICD-10-CM

## 2022-12-22 DIAGNOSIS — Z20.828 CONTACT WITH OR EXPOSURE TO VIRAL DISEASE: ICD-10-CM

## 2022-12-22 LAB
CTP QC/QA: YES
CTP QC/QA: YES
FLUAV AG NPH QL: POSITIVE
FLUBV AG NPH QL: NEGATIVE
SARS-COV-2 AG RESP QL IA.RAPID: NEGATIVE

## 2022-12-22 PROCEDURE — 99213 PR OFFICE/OUTPT VISIT, EST, LEVL III, 20-29 MIN: ICD-10-PCS | Mod: ,,, | Performed by: NURSE PRACTITIONER

## 2022-12-22 PROCEDURE — 99213 OFFICE O/P EST LOW 20 MIN: CPT | Mod: ,,, | Performed by: NURSE PRACTITIONER

## 2022-12-22 PROCEDURE — 87426 SARSCOV CORONAVIRUS AG IA: CPT | Mod: QW,,, | Performed by: NURSE PRACTITIONER

## 2022-12-22 PROCEDURE — 87804 INFLUENZA ASSAY W/OPTIC: CPT | Mod: QW,,, | Performed by: NURSE PRACTITIONER

## 2022-12-22 PROCEDURE — 87804 POCT INFLUENZA A/B: ICD-10-PCS | Mod: QW,,, | Performed by: NURSE PRACTITIONER

## 2022-12-22 PROCEDURE — 87426 SARS CORONAVIRUS 2 ANTIGEN POCT: ICD-10-PCS | Mod: QW,,, | Performed by: NURSE PRACTITIONER

## 2022-12-22 RX ORDER — OSELTAMIVIR PHOSPHATE 75 MG/1
75 CAPSULE ORAL 2 TIMES DAILY
Qty: 10 CAPSULE | Refills: 0 | Status: SHIPPED | OUTPATIENT
Start: 2022-12-22 | End: 2022-12-27

## 2022-12-22 NOTE — PROGRESS NOTES
Subjective:       Patient ID: Eric Gold is a 57 y.o. male.    Chief Complaint: Generalized Body Aches, Cough, and Sinus Problem    Generalized Body Aches, Cough, and Sinus Problem- symptom began Monday- been taking Zycam,motrin and Nyquil OTC with little relief- daily smoker      Cough  Pertinent negatives include no chest pain, ear pain, fever, headaches, rash, sore throat or shortness of breath.   Sinus Problem  Associated symptoms include congestion, coughing and sinus pressure. Pertinent negatives include no ear pain, headaches, neck pain, shortness of breath or sore throat.   Review of Systems   Constitutional:  Negative for appetite change, fatigue and fever.   HENT:  Positive for nasal congestion and sinus pressure/congestion. Negative for ear pain and sore throat.    Eyes:  Negative for pain, discharge and itching.   Respiratory:  Positive for cough. Negative for shortness of breath.    Cardiovascular:  Negative for chest pain and leg swelling.   Gastrointestinal:  Negative for abdominal pain, change in bowel habit, nausea, vomiting and change in bowel habit.   Musculoskeletal:  Negative for back pain, gait problem and neck pain.        Body aches\     Integumentary:  Negative for rash and wound.   Neurological:  Negative for dizziness, weakness and headaches.   All other systems reviewed and are negative.      Objective:      Physical Exam  Vitals and nursing note reviewed.   Constitutional:       General: He is not in acute distress.     Appearance: Normal appearance. He is not ill-appearing, toxic-appearing or diaphoretic.   HENT:      Head: Normocephalic.      Right Ear: Tympanic membrane, ear canal and external ear normal.      Left Ear: Tympanic membrane, ear canal and external ear normal.      Nose: Congestion present. No rhinorrhea.      Mouth/Throat:      Mouth: Mucous membranes are moist.      Pharynx: Posterior oropharyngeal erythema present. No oropharyngeal exudate.   Eyes:      General:  No scleral icterus.        Right eye: No discharge.         Left eye: No discharge.      Extraocular Movements: Extraocular movements intact.      Conjunctiva/sclera: Conjunctivae normal.      Pupils: Pupils are equal, round, and reactive to light.   Cardiovascular:      Rate and Rhythm: Normal rate and regular rhythm.      Pulses: Normal pulses.      Heart sounds: Normal heart sounds. No murmur heard.  Pulmonary:      Effort: Pulmonary effort is normal. No respiratory distress.      Breath sounds: No wheezing, rhonchi or rales.      Comments: Coarse breath sounds  Musculoskeletal:         General: Normal range of motion.      Cervical back: Neck supple. No tenderness.   Lymphadenopathy:      Cervical: No cervical adenopathy.   Skin:     General: Skin is warm and dry.      Capillary Refill: Capillary refill takes less than 2 seconds.      Findings: No rash.   Neurological:      Mental Status: He is alert and oriented to person, place, and time.   Psychiatric:         Mood and Affect: Mood normal.         Behavior: Behavior normal.         Thought Content: Thought content normal.         Judgment: Judgment normal.          Assessment:       1. Contact with or exposure to viral disease    2. Influenza A        Plan:   Contact with or exposure to viral disease  -     POCT Influenza A/B  -     SARS Coronavirus 2 Antigen, POCT    Influenza A  -     oseltamivir (TAMIFLU) 75 MG capsule; Take 1 capsule (75 mg total) by mouth 2 (two) times daily. for 5 days  Dispense: 10 capsule; Refill: 0         Risks, benefits, and side effects were discussed with the patient. All questions were answered to the fullest satisfaction of the patient, and pt verbalized understanding and agreement to treatment plan. Pt was to call with any new or worsening symptoms, or present to the ER

## 2023-01-12 ENCOUNTER — PATIENT MESSAGE (OUTPATIENT)
Dept: FAMILY MEDICINE | Facility: CLINIC | Age: 58
End: 2023-01-12
Payer: COMMERCIAL

## 2023-04-18 ENCOUNTER — CLINICAL SUPPORT (OUTPATIENT)
Dept: PRIMARY CARE CLINIC | Facility: CLINIC | Age: 58
End: 2023-04-18
Payer: COMMERCIAL

## 2023-04-18 DIAGNOSIS — Z01.10 ENCOUNTER FOR HEARING EXAMINATION, UNSPECIFIED WHETHER ABNORMAL FINDINGS: Primary | ICD-10-CM

## 2023-04-18 PROCEDURE — 92552 PURE TONE AUDIOMETRY AIR: CPT | Mod: ,,, | Performed by: NURSE PRACTITIONER

## 2023-04-18 PROCEDURE — 92552 PR PURE TONE AUDIOMETRY, AIR: ICD-10-PCS | Mod: ,,, | Performed by: NURSE PRACTITIONER

## 2023-04-18 NOTE — PROGRESS NOTES
Subjective     Patient ID: Eric Gold is a 57 y.o. male.    Chief Complaint: No chief complaint on file.    HPI  Review of Systems       Objective     Physical Exam       Assessment and Plan     Problem List Items Addressed This Visit    None  Visit Diagnoses       Encounter for hearing examination, unspecified whether abnormal findings    -  Primary            Audiogram only

## 2023-06-07 ENCOUNTER — OFFICE VISIT (OUTPATIENT)
Dept: FAMILY MEDICINE | Facility: CLINIC | Age: 58
End: 2023-06-07
Payer: COMMERCIAL

## 2023-06-07 VITALS
BODY MASS INDEX: 31.83 KG/M2 | OXYGEN SATURATION: 98 % | RESPIRATION RATE: 20 BRPM | SYSTOLIC BLOOD PRESSURE: 124 MMHG | HEIGHT: 72 IN | WEIGHT: 235 LBS | HEART RATE: 76 BPM | TEMPERATURE: 98 F | DIASTOLIC BLOOD PRESSURE: 80 MMHG

## 2023-06-07 DIAGNOSIS — Z23 NEED FOR SHINGLES VACCINE: ICD-10-CM

## 2023-06-07 DIAGNOSIS — I10 ESSENTIAL HYPERTENSION: Primary | Chronic | ICD-10-CM

## 2023-06-07 DIAGNOSIS — M54.50 CHRONIC BILATERAL LOW BACK PAIN WITHOUT SCIATICA: Chronic | ICD-10-CM

## 2023-06-07 DIAGNOSIS — E66.9 OBESITY, CLASS I, BMI 30-34.9: Chronic | ICD-10-CM

## 2023-06-07 DIAGNOSIS — E78.00 HYPERCHOLESTEREMIA: Chronic | ICD-10-CM

## 2023-06-07 DIAGNOSIS — G89.29 CHRONIC BILATERAL LOW BACK PAIN WITHOUT SCIATICA: Chronic | ICD-10-CM

## 2023-06-07 DIAGNOSIS — R73.03 PREDIABETES: ICD-10-CM

## 2023-06-07 PROBLEM — J10.1 INFLUENZA A: Status: RESOLVED | Noted: 2022-12-22 | Resolved: 2023-06-07

## 2023-06-07 PROBLEM — E66.811 OBESITY, CLASS I, BMI 30-34.9: Chronic | Status: ACTIVE | Noted: 2022-10-27

## 2023-06-07 PROCEDURE — 1159F PR MEDICATION LIST DOCUMENTED IN MEDICAL RECORD: ICD-10-PCS | Mod: ,,, | Performed by: NURSE PRACTITIONER

## 2023-06-07 PROCEDURE — 99214 PR OFFICE/OUTPT VISIT, EST, LEVL IV, 30-39 MIN: ICD-10-PCS | Mod: 25,,, | Performed by: NURSE PRACTITIONER

## 2023-06-07 PROCEDURE — 1159F MED LIST DOCD IN RCRD: CPT | Mod: ,,, | Performed by: NURSE PRACTITIONER

## 2023-06-07 PROCEDURE — 90471 ZOSTER RECOMBINANT VACCINE: ICD-10-PCS | Mod: ,,, | Performed by: NURSE PRACTITIONER

## 2023-06-07 PROCEDURE — 3074F PR MOST RECENT SYSTOLIC BLOOD PRESSURE < 130 MM HG: ICD-10-PCS | Mod: ,,, | Performed by: NURSE PRACTITIONER

## 2023-06-07 PROCEDURE — 90471 IMMUNIZATION ADMIN: CPT | Mod: ,,, | Performed by: NURSE PRACTITIONER

## 2023-06-07 PROCEDURE — 3008F PR BODY MASS INDEX (BMI) DOCUMENTED: ICD-10-PCS | Mod: ,,, | Performed by: NURSE PRACTITIONER

## 2023-06-07 PROCEDURE — 3008F BODY MASS INDEX DOCD: CPT | Mod: ,,, | Performed by: NURSE PRACTITIONER

## 2023-06-07 PROCEDURE — 4010F PR ACE/ARB THEARPY RXD/TAKEN: ICD-10-PCS | Mod: ,,, | Performed by: NURSE PRACTITIONER

## 2023-06-07 PROCEDURE — 1160F RVW MEDS BY RX/DR IN RCRD: CPT | Mod: ,,, | Performed by: NURSE PRACTITIONER

## 2023-06-07 PROCEDURE — 4010F ACE/ARB THERAPY RXD/TAKEN: CPT | Mod: ,,, | Performed by: NURSE PRACTITIONER

## 2023-06-07 PROCEDURE — 99214 OFFICE O/P EST MOD 30 MIN: CPT | Mod: 25,,, | Performed by: NURSE PRACTITIONER

## 2023-06-07 PROCEDURE — 90750 ZOSTER RECOMBINANT VACCINE: ICD-10-PCS | Mod: ,,, | Performed by: NURSE PRACTITIONER

## 2023-06-07 PROCEDURE — 1160F PR REVIEW ALL MEDS BY PRESCRIBER/CLIN PHARMACIST DOCUMENTED: ICD-10-PCS | Mod: ,,, | Performed by: NURSE PRACTITIONER

## 2023-06-07 PROCEDURE — 3079F PR MOST RECENT DIASTOLIC BLOOD PRESSURE 80-89 MM HG: ICD-10-PCS | Mod: ,,, | Performed by: NURSE PRACTITIONER

## 2023-06-07 PROCEDURE — 3074F SYST BP LT 130 MM HG: CPT | Mod: ,,, | Performed by: NURSE PRACTITIONER

## 2023-06-07 PROCEDURE — 3079F DIAST BP 80-89 MM HG: CPT | Mod: ,,, | Performed by: NURSE PRACTITIONER

## 2023-06-07 PROCEDURE — 90750 HZV VACC RECOMBINANT IM: CPT | Mod: ,,, | Performed by: NURSE PRACTITIONER

## 2023-06-07 RX ORDER — CYCLOBENZAPRINE HCL 10 MG
10 TABLET ORAL 3 TIMES DAILY PRN
Qty: 90 TABLET | Refills: 1 | Status: SHIPPED | OUTPATIENT
Start: 2023-06-07 | End: 2023-09-27 | Stop reason: SDUPTHER

## 2023-06-07 RX ORDER — LISINOPRIL AND HYDROCHLOROTHIAZIDE 12.5; 2 MG/1; MG/1
1 TABLET ORAL DAILY
Qty: 90 TABLET | Refills: 1 | Status: SHIPPED | OUTPATIENT
Start: 2023-06-07 | End: 2023-09-27 | Stop reason: SDUPTHER

## 2023-06-07 RX ORDER — IBUPROFEN 800 MG/1
800 TABLET ORAL 3 TIMES DAILY PRN
Qty: 90 TABLET | Refills: 1 | Status: SHIPPED | OUTPATIENT
Start: 2023-06-07 | End: 2023-09-27 | Stop reason: SDUPTHER

## 2023-06-07 RX ORDER — PHENTERMINE HYDROCHLORIDE 37.5 MG/1
37.5 TABLET ORAL
Qty: 30 TABLET | Refills: 0 | Status: SHIPPED | OUTPATIENT
Start: 2023-06-07 | End: 2023-07-07

## 2023-06-07 RX ORDER — ATORVASTATIN CALCIUM 10 MG/1
10 TABLET, FILM COATED ORAL NIGHTLY
Qty: 90 TABLET | Refills: 3 | Status: SHIPPED | OUTPATIENT
Start: 2023-06-07 | End: 2023-09-27 | Stop reason: SDUPTHER

## 2023-06-07 NOTE — PROGRESS NOTES
[facility] - FAMILY MEDICINE       PATIENT NAME: Eric Gold   : 1965    AGE: 57 y.o. DATE OF ENCOUNTER: 23    MRN: [id number]      PCP: BUSTER Pratt    Reason for Visit / Chief Complaint:  Follow-up (Patient presents to the clinic 6mf/u htn)         274}    Subjective:     HPI:    Presents for past due for 6-mth f/u of HTN.  Needs med refills.    Requests Rx for phentermine again to help get his appetite under control, has been effective in the past.  Per  the only Adipex Rx filled in the past 365 days was in 2022.    Review of Systems:   Review of Systems   Constitutional: Negative.    HENT: Negative.     Eyes: Negative.    Respiratory: Negative.     Cardiovascular: Negative.    Gastrointestinal: Negative.    Genitourinary: Negative.    Musculoskeletal: Negative.    Skin: Negative.    Neurological: Negative.    Hematological: Negative.    Psychiatric/Behavioral: Negative.       Allergies and Meds: 274}   Review of patient's allergies indicates:  No Known Allergies     Current Outpatient Medications:     atorvastatin (LIPITOR) 10 MG tablet, Take 1 tablet (10 mg total) by mouth every evening., Disp: 90 tablet, Rfl: 3    cyclobenzaprine (FLEXERIL) 10 MG tablet, Take 1 tablet (10 mg total) by mouth 3 (three) times daily as needed for Muscle spasms., Disp: 90 tablet, Rfl: 1    ibuprofen (ADVIL,MOTRIN) 800 MG tablet, Take 1 tablet (800 mg total) by mouth 3 (three) times daily as needed for Pain. Take with food. Avoid all other NSAIDs., Disp: 90 tablet, Rfl: 1    lisinopriL-hydrochlorothiazide (PRINZIDE,ZESTORETIC) 20-12.5 mg per tablet, Take 1 tablet by mouth once daily., Disp: 90 tablet, Rfl: 1    phentermine (ADIPEX-P) 37.5 mg tablet, Take 1 tablet (37.5 mg total) by mouth before breakfast., Disp: 30 tablet, Rfl: 0    Labs:274}    I have reviewed old labs below:  Lab Results   Component Value Date    WBC 7.49 10/27/2022    RBC 5.74 10/27/2022    HGB 16.9  10/27/2022    HCT 50.0 10/27/2022     10/27/2022     10/27/2022    K 4.2 10/27/2022     10/27/2022    CALCIUM 8.9 10/27/2022     10/27/2022    BUN 12 10/27/2022    CREATININE 1.02 10/27/2022    EGFRNONAA 77 10/14/2021    ALT 72 (H) 10/27/2022    AST 30 10/27/2022    CHOL 160 10/27/2022    TRIG 173 (H) 10/27/2022    HDL 35 (L) 10/27/2022    LDLCALC 90 10/27/2022    TSH 1.200 10/27/2022    PSA 1.190 10/27/2022    HGBA1C 5.9 10/27/2022       Medical History: 274}     Past Medical History:   Diagnosis Date    Anxiety     Class 1 obesity due to excess calories with body mass index (BMI) of 32.0 to 32.9 in adult 9/8/2021    History of colon polyps 09/30/2020    Dr. Segovia, repeat 5 years    Hyperlipidemia     Hypertension     Kidney stone     Nicotine dependence, cigarettes, uncomplicated       Social History     Tobacco Use   Smoking Status Every Day    Packs/day: 1.00    Years: 37.00    Pack years: 37.00    Types: Cigarettes   Smokeless Tobacco Never      Past Surgical History:   Procedure Laterality Date    APPENDECTOMY      COLONOSCOPY          Health Maintenance: 274}     Health Maintenance         Date Due Completion Date    LDCT Lung Screen Declines ---    Shingles Vaccine (1 of 2) Never done - dose 1 today. ---    COVID-19 Vaccine (4 - Pfizer series) 12/09/2021 10/14/2021    HIV Screening 10/14/2027 (Originally 6/15/1980) ---    PROSTATE-SPECIFIC ANTIGEN 10/27/2023 10/27/2022    Override on 5/5/2020: Done (normal 0.968)    Hemoglobin A1c (Prediabetes) 10/27/2023 10/27/2022    Lipid Panel 10/27/2023 10/27/2022    Override on 8/6/2020: Done    TETANUS VACCINE 02/18/2024 2/18/2014    Colorectal Cancer Screening 09/30/2025 9/30/2020 (Done)    Override on 9/30/2020: Done (Dr. Segovia. Repeat in 5 years. Scanned into documents.)            Objective:  274}   /80 (BP Location: Right arm, Patient Position: Sitting, BP Method: Large (Automatic))   Pulse 76   Temp 97.8 °F (36.6 °C) (Oral)    Resp 20   Ht 6' (1.829 m)   Wt 106.6 kg (235 lb)   SpO2 98%   BMI 31.87 kg/m²     Wt Readings from Last 3 Encounters:   06/07/23 106.6 kg (235 lb)   12/22/22 105.7 kg (233 lb)   10/27/22 107 kg (235 lb 12.8 oz)     BP Readings from Last 3 Encounters:   06/07/23 124/80   12/22/22 (!) 150/93   10/27/22 124/78     Body mass index is 31.87 kg/m².     Physical Exam  Vitals and nursing note reviewed.   Constitutional:       General: He is not in acute distress.     Appearance: Normal appearance.   HENT:      Head: Normocephalic.      Right Ear: Tympanic membrane, ear canal and external ear normal.      Left Ear: Tympanic membrane, ear canal and external ear normal.      Nose: Nose normal.      Mouth/Throat:      Mouth: Mucous membranes are moist.      Pharynx: Oropharynx is clear.   Eyes:      Conjunctiva/sclera: Conjunctivae normal.      Pupils: Pupils are equal, round, and reactive to light.   Neck:      Thyroid: No thyromegaly.      Vascular: Normal carotid pulses. No carotid bruit.   Cardiovascular:      Rate and Rhythm: Normal rate and regular rhythm.      Pulses: Normal pulses.      Heart sounds: Normal heart sounds.   Pulmonary:      Effort: Pulmonary effort is normal. No respiratory distress.      Breath sounds: Normal breath sounds.   Abdominal:      Palpations: Abdomen is soft.      Tenderness: There is no abdominal tenderness.   Musculoskeletal:      Cervical back: Neck supple.      Right lower leg: No edema.      Left lower leg: No edema.   Lymphadenopathy:      Cervical: No cervical adenopathy.   Skin:     General: Skin is warm and dry.   Neurological:      General: No focal deficit present.      Mental Status: He is alert and oriented to person, place, and time.   Psychiatric:         Mood and Affect: Mood normal.         Behavior: Behavior normal.        Assessment and Plan: 274}     1. Essential hypertension  Comments:  Controlled  Orders:  -     lisinopriL-hydrochlorothiazide (PRINZIDE,ZESTORETIC)  20-12.5 mg per tablet; Take 1 tablet by mouth once daily.  Dispense: 90 tablet; Refill: 1  -     Comprehensive Metabolic Panel; Future; Expected date: 07/07/2023    2. Hypercholesteremia  -     atorvastatin (LIPITOR) 10 MG tablet; Take 1 tablet (10 mg total) by mouth every evening.  Dispense: 90 tablet; Refill: 3  -     Comprehensive Metabolic Panel; Future; Expected date: 07/07/2023  -     Lipid Panel; Future; Expected date: 07/07/2023    3. Chronic bilateral low back pain without sciatica  -     cyclobenzaprine (FLEXERIL) 10 MG tablet; Take 1 tablet (10 mg total) by mouth 3 (three) times daily as needed for Muscle spasms.  Dispense: 90 tablet; Refill: 1  -     ibuprofen (ADVIL,MOTRIN) 800 MG tablet; Take 1 tablet (800 mg total) by mouth 3 (three) times daily as needed for Pain. Take with food. Avoid all other NSAIDs.  Dispense: 90 tablet; Refill: 1    4. Need for shingles vaccine  -     Zoster Recombinant Vaccine    5. Obesity, Class I, BMI 30-34.9  Comments:  Phentermine Rx to be used in conjunction with healthy, reduced calorie diet and exercise.   reviewed without suspicious activity noted.  F/u one-mth.  Orders:  -     phentermine (ADIPEX-P) 37.5 mg tablet; Take 1 tablet (37.5 mg total) by mouth before breakfast.  Dispense: 30 tablet; Refill: 0    6. Prediabetes  Overview:  Lab Results   Component Value Date    HGBA1C 5.9 10/27/2022         Orders:  -     Comprehensive Metabolic Panel; Future; Expected date: 07/07/2023  -     Hemoglobin A1C; Future; Expected date: 07/07/2023    Declines LDCT.  Advised smoking cessation.  Shingrix #1 today & #2 due in 2-6 mths    Return to clinic 1 mth f/u wt management, predm, HLD w/ fasting labs (lab orders are in); and sooner as needed.    Signature:  BUSTER Pratt

## 2023-06-07 NOTE — PATIENT INSTRUCTIONS
"Patient Education       Quitting Smoking   The Basics   Written by the doctors and editors at [facility]   What are the benefits of quitting smoking? -- Quitting smoking can dramatically improve your health and help you live longer. It lowers your risk of heart disease, lung disease, kidney failure, infection, and cancer.   Quitting smoking can also lower your chances of getting osteoporosis, a condition that makes your bones weak. Plus, it can help your skin look younger and reduce the chances that you will have problems with sex.  Quitting is not easy for most people, and it can take several tries to completely quit. But help and support are available. Quitting smoking will improve your health no matter how old you are, even if you have smoked for a long time.   What should I do if I want to quit smoking? -- It's a good idea to start by talking with your doctor or nurse. It is possible to quit on your own, without help. But getting help greatly increases your chances of quitting successfully.  When you are ready to quit, you will make a plan to:  Set a quit date  Tell your family and friends that you plan to quit  Plan ahead for the challenges you will face, such as cigarette cravings  Remove cigarettes from your home, car, and work  How can my doctor or nurse help? -- Your doctor or nurse can give you advice on the best way to quit. They can also give you medicines to:  Reduce your craving for cigarettes  Reduce your "withdrawal" symptoms (symptoms that happen when you stop smoking)  Your doctor or nurse can also help you find a counselor to talk to. For most people who are trying to quit smoking, it works best to use both medicines and counseling.  You can also get help from a free phone line (9-453-QUIT-NOW or 1-247.767.4977) or go online to www.smokefree.gov.  What are the symptoms of withdrawal? -- When you stop smoking, you might have symptoms such as:  Trouble sleeping  Feeling irritable, anxious, or " restless  Getting frustrated or angry  Having trouble thinking clearly  These symptoms can be hard to deal with, which is why it can be so hard to quit. But medicines can help.  Some people who stop smoking become temporarily depressed. Some people need treatment for depression, such as counseling or medicines or both. People with depression might:  No longer enjoy or care about doing the things they used to like to do  Feel sad, down, hopeless, nervous, or cranky most of the day, almost every day  Lose or gain weight  Sleep too much or too little  Feel tired or like they have no energy  Feel guilty or like they are worth nothing  Forget things or feel confused  Move and speak more slowly than usual  Act restless or have trouble staying still  Think about death or suicide  If you think you might be depressed, tell your doctor or nurse right away. They can talk to you about your symptoms and recommend treatment if needed.  If you ever feel like you might hurt yourself, go straight to the nearest emergency department or call for an ambulance (in the [place] and [place], dial 9-1-1). You can also call your doctor or nurse and tell them it is an emergency, or reach the  National Suicide Prevention Lifeline at 1-674.684.9320 or www.suicidepreventionlifeline.org.  How does counseling work? -- A counselor can help you figure out:  What triggers you to want to smoke, and how to handle these situations  How to resist cravings  What you can do differently if you have tried to quit before  You can meet with a counselor in one-on-one sessions or as part of a group. There are other ways to get counseling, too, such as over the phone, through text messaging, or online.   How do medicines help you stop smoking? -- Different medicines work in different ways:  Nicotine replacement therapy - Nicotine is the main drug in cigarettes, and the reason they are addictive. These medicines reduce your body's craving for nicotine. They also help  "with withdrawal symptoms.  There are different forms of nicotine replacement, including skin patches, lozenges, gum, nasal sprays, and inhalers. Most can be bought without a prescription. Also, health insurance might cover some or all of the cost.  It often helps to use 2 forms of nicotine replacement. For example, you might wear a patch all the time, plus use gum or lozenges when you get a craving to smoke.  Varenicline - Varenicline (brand names: Chantix, Champix) is a prescription medicine that reduces withdrawal symptoms and cigarette cravings. Varenicline can increase the effects of alcohol in some people. It's a good idea to limit drinking while you're taking it, at least until you know how it affects you.  Even if you are not yet ready to commit to a quit date, varenicline can help reduce cravings. This can make it easier to quit when you are ready.  Bupropion - Bupropion (sample brand names: Wellbutrin, Zyban) is a prescription medicine that reduces your desire to smoke. It is also available in a generic version, which is cheaper than the brand name medicines. Doctors do not usually prescribe bupropion for people with seizures or who have had seizures in the past.  It might also be helpful to combine nicotine replacement with bupropion or varenicline. In some cases, a person might even take both bupropion and varenicline. Your doctor or nurse can help you figure out the best combination for you.  What about e-cigarettes? -- Sometimes people wonder if using electronic cigarettes, or "e-cigarettes," can help them quit smoking. Using e-cigarettes is also called "vaping." Doctors do not recommend e-cigarettes in place of medicines and counseling. That's because e-cigarettes still contain nicotine as well as other substances that might be harmful. It's also not clear how they can affect a person's health in the long term.  What if I am pregnant and I smoke? -- If you are pregnant, it's really important for the " health of your baby that you quit. Ask your doctor what options you have, and what is safest for your baby.  Will I gain weight if I quit? -- You might gain a few pounds. This can be frustrating for some people, but it's important to remember that you are improving your health by quitting smoking. You can help prevent gaining a lot of weight by staying active and eating a healthy diet.  What if I am not able to quit? -- If you don't quit on your first try, or if you quit but then start smoking again, don't give up hope. Lots of people have to try more than once before they are able to completely quit.  It might help to try to understand why quitting did not work. There might be something you can do differently when you try again. It can help to figure out what situations make you want to smoke, so you can avoid them.   What else can I do to improve my chances of quitting? -- You can:  Get regular exercise. Any type of physical activity, even gentle forms of movement, is good for your health. Physical activity can also help reduce stress.  Stay away from smokers and places that make you want to smoke. If people close to you smoke, ask them to quit with you or avoid smoking around you.  Carry gum, hard candy, or something to put in your mouth. If you get a craving for a cigarette, try one of these instead.  Don't give up, even if you start smoking again. It takes most people a few tries before they succeed.  All topics are updated as new evidence becomes available and our peer review process is complete.  This topic retrieved from [employer] on: Sep 21, 2021.  Topic 35991 Version 22.0  Release: 29.4.2 - C29.263  © 2021 UpToDate, Inc. and/or its affiliates. All rights reserved.  Consumer Information Use and Disclaimer   This information is not specific medical advice and does not replace information you receive from your health care provider. This is only a brief summary of general information. It does NOT include all  "information about conditions, illnesses, injuries, tests, procedures, treatments, therapies, discharge instructions or life-style choices that may apply to you. You must talk with your health care provider for complete information about your health and treatment options. This information should not be used to decide whether or not to accept your health care provider's advice, instructions or recommendations. Only your health care provider has the knowledge and training to provide advice that is right for you. The use of this information is governed by the [employer] End User License Agreement, available at https://www.Reviews42/en/solutions/[employer]/about/anna.The use of [employer] content is governed by the [employer] Terms of Use. ©2021 UpToDate, Inc. All rights reserved.  Copyright   © 2021 UpToDate, Inc. and/or its affiliates. All rights reserved.  Patient Education       High Blood Pressure in Adults   The Basics   Written by the doctors and editors at [facility]   What is high blood pressure? -- High blood pressure is a condition that puts you at risk for heart attack, stroke, and kidney disease. It does not usually cause symptoms. But it can be serious.  When your doctor or nurse tells you your blood pressure, they will say 2 numbers. For instance, your doctor or nurse might say that your blood pressure is "130 over 80." The top number is the pressure inside your arteries when your heart is sydnee. The bottom number is the pressure inside your arteries when your heart is relaxed.  "Elevated blood pressure" is a term doctors or nurses use as a warning. People with elevated blood pressure do not yet have high blood pressure. But their blood pressure is not as low as it should be for good health.  Many experts define high, elevated, and normal blood pressure as follows:  High - Top number of 130 or above and/or bottom number of 80 or above  Elevated - Top number between 120 and 129 and bottom number of 79 " "or below  Normal - Top number of 119 or below and bottom number of 79 or below  This information is also in the table (table 1).   How can I lower my blood pressure? -- If your doctor or nurse has prescribed blood pressure medicine, the most important thing you can do is to take it. If it causes side effects, do not just stop taking it. Instead, talk to your doctor or nurse about the problems it causes. They might be able to lower your dose or switch you to another medicine. If cost is a problem, mention that too. They might be able to put you on a less expensive medicine. Taking your blood pressure medicine can keep you from having a heart attack or stroke, and it can save your life!  Can I do anything on my own? -- You have a lot of control over your blood pressure. To lower it:  Lose weight (if you are overweight)  Choose a diet low in fat and rich in fruits, vegetables, and low-fat dairy products  Reduce the amount of salt you eat  Do something active for at least 30 minutes a day on most days of the week  Cut down on alcohol (if you drink more than 2 alcoholic drinks per day)  It's also a good idea to get a home blood pressure meter. People who check their own blood pressure at home do better at keeping it low and can sometimes even reduce the amount of medicine they take.  All topics are updated as new evidence becomes available and our peer review process is complete.  This topic retrieved from [employer] on: Sep 21, 2021.  Topic 31717 Version 15.0  Release: 29.4.2 - C29.263  © 2021 UpToDate, Inc. and/or its affiliates. All rights reserved.  table 1: Definition of normal and high blood pressure  Level  Top number  Bottom number    High 130 or above 80 or above   Elevated 120 to 129 79 or below   Normal 119 or below 79 or below   These definitions are from the American College of Cardiology/American Heart Association. Other expert groups might use slightly different definitions.  "Elevated blood pressure" is a " term doctor or nurses use as a warning. It means you do not yet have high blood pressure, but your blood pressure is not as low as it should be for good health.  Graphic 33961 Version 6.0  Consumer Information Use and Disclaimer   This information is not specific medical advice and does not replace information you receive from your health care provider. This is only a brief summary of general information. It does NOT include all information about conditions, illnesses, injuries, tests, procedures, treatments, therapies, discharge instructions or life-style choices that may apply to you. You must talk with your health care provider for complete information about your health and treatment options. This information should not be used to decide whether or not to accept your health care provider's advice, instructions or recommendations. Only your health care provider has the knowledge and training to provide advice that is right for you. The use of this information is governed by the [employer] End User License Agreement, available at https://www.[employer]ordium Links/en/solutions/[employer]/about/anna.The use of [employer] content is governed by the [employer] Terms of Use. ©2021 UpToDate, Inc. All rights reserved.  Copyright   © 2021 UpToDate, Inc. and/or its affiliates. All rights reserved.  Patient Education       Obesity Discharge Instructions, Adult   About this topic   Obesity is a health problem where your weight is higher than it should be. Doctors use a method called body mass index or BMI to measure whether you are at a healthy weight for your height. The doctor uses your weight and your height to determine your BMI. A high BMI can be an indicator of high body fat. Obesity is different from being overweight. Being overweight means your BMI is 25 or higher. Being obese means your BMI is 30 or higher. If your BMI is 40 or higher, you are considered severely or morbidly obese. Being obese may lead to many health problems.  It may make it hard for you to breathe and move easily. It may raise your risk for illnesses like high blood sugar and heart disease.  What care is needed at home?   Ask your doctor what you need to do when you go home. Make sure you understand everything the doctor says. This way you will know what you need to do.  Change your diet. Lower the calories in your diet. Ask your dietitian to help you set an eating plan that is right for you.  Get enough exercise. Talk to your doctor about the right amount of exercise for you.  Do not smoke.  Limit the amount of beer, wine, and mixed drinks (alcohol) you drink.  Keep a record of your weight. Write down what you eat and drink each day. This may help you be more aware of the choices you are making.  What drugs may be needed?   The doctor may order drugs to:  Treat health problems that may cause weight gain  Lower your appetite  Help you lose weight  Will physical activity be limited?   Talk to your doctor about the right amount of exercise for you. This is especially important if you have heart problems, other illnesses, or if you recently had surgery.  Start slowly by doing more everyday activities like yard work or household chores.  Choose activities that you like to do.  What changes to diet are needed?   Whole grains are a good source of carbohydrates and fiber. Try to eat 3 to 5 servings of whole grain, high fiber foods each day. These are things like whole grain bread, bran cereals, brown rice, and whole wheat pasta.  Fruits and vegetables are good sources of fiber, vitamins, and minerals. Try to pick many kinds and colors. This will help you get different nutrients in your diet. Choose fresh, frozen, or canned fruits and vegetables. Buy plain, frozen fruits without added sugar. Buy plain, frozen vegetables without added salt and fat. Buy canned fruit in 100% juice or water. Avoid canned fruit in syrups. Buy canned vegetables with no salt added.  Milk is a good  source of protein and some vitamins and minerals. Choose low-fat (1%) or fat-free milk. Eat nonfat or low-fat cheeses, ice creams, yogurt, and other dairy products.  Meats and beans are good sources of protein and iron. Eat more low-fat or lean meats like chicken without the skin, turkey without the skin, and fish. Eggs and peanut butter are good sources of protein as well. Dried peas, beans, and lentils are also good and contain fiber. Fatty fish, like salmon, tuna, mackerel, herring, and trout, are good to eat and have healthy omega-3 fats.  Good fats can give you long-term energy. These are found in fish, nuts, seeds, and avocados. Try using olive oil, safflower oil, and low-sodium, low-fat salad dressing as a topping on foods. Use canola, olive, or peanut oil for cooking. Other healthy oils include corn, sunflower, and soybean oils.  Limit sweets such as candy and sugary drinks. Try to drink water instead. Drink diet or no calorie beverages when you want something other than water.  Cut back on solid fats, like butter, lard, and coconut oil.  Limit fatty foods such as desserts, fried foods, and chips.  Trans fats should be avoided. Most trans fats are found in processed foods, commercially baked goods, and fried foods and are very unhealthy. Saturated fat, which is different from trans fat, should be watched and limited if portions are too large. Saturated fat is found mainly in animal sources, such as meat and dairy products. Saturated fat is also found in coconut and palm oils.  Limit processed meats and most processed foods.  Limit eating out. If you choose to visit a restaurant, ask for the nutritional facts. You may also be able to find nutritional facts online. Then, you can make a plan and choose healthy items. Watch the portion size. Have large portions split and take part home for some other meal.  What problems could happen?   Low mood or self-esteem  Anxiety  Muscle pain, joint pain, or  arthritis  Sleep apnea  Diabetes  High blood pressure  High cholesterol  Heart, lung, or breathing problems  Kidney or liver problems  Cancer  Gallstones  Increased sweating  Reduced fertility  Pregnancy complications  Gastroesophageal reflux disease  When do I need to call the doctor?   Signs of high or low blood sugar  Thoughts of hurting yourself  Teach Back: Helping You Understand   The Teach Back Method helps you understand the information we are giving you. After you talk with the staff, tell them in your own words what you learned. This helps to make sure the staff has described each thing clearly. It also helps to explain things that may have been confusing. Before going home, make sure you can do these:  I can tell you about my condition.  I can tell you what changes I need to make with my diet or drugs.  I can tell you what I will do if I have signs of a heart attack or stroke.  Where can I learn more?   Centers for Disease Control and Prevention  http://www.cdc.gov/obesity/adult/defining.html   NHS  http://www.nhs.uk/Conditions/Obesity/Pages/obesityprevention.aspx   Last Reviewed Date   2021-06-23  Consumer Information Use and Disclaimer   This information is not specific medical advice and does not replace information you receive from your health care provider. This is only a brief summary of general information. It does NOT include all information about conditions, illnesses, injuries, tests, procedures, treatments, therapies, discharge instructions or life-style choices that may apply to you. You must talk with your health care provider for complete information about your health and treatment options. This information should not be used to decide whether or not to accept your health care providers advice, instructions or recommendations. Only your health care provider has the knowledge and training to provide advice that is right for you.  Copyright   Copyright © 2021 UpToDate, Inc. and its affiliates  and/or licensors. All rights reserved.

## 2023-09-20 ENCOUNTER — TELEPHONE (OUTPATIENT)
Dept: FAMILY MEDICINE | Facility: CLINIC | Age: 58
End: 2023-09-20
Payer: COMMERCIAL

## 2023-09-20 DIAGNOSIS — G89.29 CHRONIC BILATERAL LOW BACK PAIN WITHOUT SCIATICA: Chronic | ICD-10-CM

## 2023-09-20 DIAGNOSIS — M54.50 CHRONIC BILATERAL LOW BACK PAIN WITHOUT SCIATICA: Chronic | ICD-10-CM

## 2023-09-20 RX ORDER — CYCLOBENZAPRINE HCL 10 MG
10 TABLET ORAL 3 TIMES DAILY PRN
Qty: 90 TABLET | Refills: 1 | Status: CANCELLED | OUTPATIENT
Start: 2023-09-20

## 2023-09-20 RX ORDER — IBUPROFEN 800 MG/1
800 TABLET ORAL 3 TIMES DAILY PRN
Qty: 90 TABLET | Refills: 1 | Status: CANCELLED | OUTPATIENT
Start: 2023-09-20

## 2023-09-20 NOTE — TELEPHONE ENCOUNTER
----- Message from Susan Hodges sent at 9/20/2023 12:40 PM CDT -----  Pt need refill on cyclobenzaprin and ibuprofen 800mg sent to  [facility]

## 2023-09-27 ENCOUNTER — OFFICE VISIT (OUTPATIENT)
Dept: FAMILY MEDICINE | Facility: CLINIC | Age: 58
End: 2023-09-27
Payer: COMMERCIAL

## 2023-09-27 VITALS
TEMPERATURE: 98 F | HEIGHT: 72 IN | OXYGEN SATURATION: 96 % | DIASTOLIC BLOOD PRESSURE: 79 MMHG | HEART RATE: 82 BPM | WEIGHT: 239 LBS | RESPIRATION RATE: 20 BRPM | SYSTOLIC BLOOD PRESSURE: 127 MMHG | BODY MASS INDEX: 32.37 KG/M2

## 2023-09-27 DIAGNOSIS — I10 ESSENTIAL HYPERTENSION: Primary | Chronic | ICD-10-CM

## 2023-09-27 DIAGNOSIS — M54.50 CHRONIC BILATERAL LOW BACK PAIN WITHOUT SCIATICA: Chronic | ICD-10-CM

## 2023-09-27 DIAGNOSIS — E66.9 OBESITY, CLASS I, BMI 30-34.9: Chronic | ICD-10-CM

## 2023-09-27 DIAGNOSIS — G89.29 CHRONIC BILATERAL LOW BACK PAIN WITHOUT SCIATICA: Chronic | ICD-10-CM

## 2023-09-27 DIAGNOSIS — R73.03 PREDIABETES: Chronic | ICD-10-CM

## 2023-09-27 DIAGNOSIS — Z23 NEED FOR SHINGLES VACCINE: ICD-10-CM

## 2023-09-27 DIAGNOSIS — E78.00 HYPERCHOLESTEREMIA: Chronic | ICD-10-CM

## 2023-09-27 LAB
ALBUMIN SERPL BCP-MCNC: 4.2 G/DL (ref 3.5–5)
ALBUMIN/GLOB SERPL: 1.3 {RATIO}
ALP SERPL-CCNC: 102 U/L (ref 45–115)
ALT SERPL W P-5'-P-CCNC: 92 U/L (ref 16–61)
ANION GAP SERPL CALCULATED.3IONS-SCNC: 8 MMOL/L (ref 7–16)
AST SERPL W P-5'-P-CCNC: 43 U/L (ref 15–37)
BILIRUB SERPL-MCNC: 0.5 MG/DL (ref ?–1.2)
BUN SERPL-MCNC: 14 MG/DL (ref 7–18)
BUN/CREAT SERPL: 14 (ref 6–20)
CALCIUM SERPL-MCNC: 9.1 MG/DL (ref 8.5–10.1)
CHLORIDE SERPL-SCNC: 106 MMOL/L (ref 98–107)
CHOLEST SERPL-MCNC: 155 MG/DL (ref 0–200)
CHOLEST/HDLC SERPL: 4.7 {RATIO}
CO2 SERPL-SCNC: 33 MMOL/L (ref 21–32)
CREAT SERPL-MCNC: 1.01 MG/DL (ref 0.7–1.3)
EGFR (NO RACE VARIABLE) (RUSH/TITUS): 86 ML/MIN/1.73M2
EST. AVERAGE GLUCOSE BLD GHB EST-MCNC: 114 MG/DL
GLOBULIN SER-MCNC: 3.2 G/DL (ref 2–4)
GLUCOSE SERPL-MCNC: 99 MG/DL (ref 74–106)
HBA1C MFR BLD HPLC: 6 % (ref 4.5–6.6)
HDLC SERPL-MCNC: 33 MG/DL (ref 40–60)
LDLC SERPL CALC-MCNC: 75 MG/DL
LDLC/HDLC SERPL: 2.3 {RATIO}
NONHDLC SERPL-MCNC: 122 MG/DL
POTASSIUM SERPL-SCNC: 3.6 MMOL/L (ref 3.5–5.1)
PROT SERPL-MCNC: 7.4 G/DL (ref 6.4–8.2)
SODIUM SERPL-SCNC: 143 MMOL/L (ref 136–145)
TRIGL SERPL-MCNC: 236 MG/DL (ref 35–150)
VLDLC SERPL-MCNC: 47 MG/DL

## 2023-09-27 PROCEDURE — 3078F PR MOST RECENT DIASTOLIC BLOOD PRESSURE < 80 MM HG: ICD-10-PCS | Mod: ,,, | Performed by: NURSE PRACTITIONER

## 2023-09-27 PROCEDURE — 3044F HG A1C LEVEL LT 7.0%: CPT | Mod: ,,, | Performed by: NURSE PRACTITIONER

## 2023-09-27 PROCEDURE — 1160F PR REVIEW ALL MEDS BY PRESCRIBER/CLIN PHARMACIST DOCUMENTED: ICD-10-PCS | Mod: ,,, | Performed by: NURSE PRACTITIONER

## 2023-09-27 PROCEDURE — 80053 COMPREHEN METABOLIC PANEL: CPT | Mod: ,,, | Performed by: CLINICAL MEDICAL LABORATORY

## 2023-09-27 PROCEDURE — 3078F DIAST BP <80 MM HG: CPT | Mod: ,,, | Performed by: NURSE PRACTITIONER

## 2023-09-27 PROCEDURE — 90471 ZOSTER RECOMBINANT VACCINE: ICD-10-PCS | Mod: ,,, | Performed by: NURSE PRACTITIONER

## 2023-09-27 PROCEDURE — 80061 LIPID PANEL: CPT | Mod: ,,, | Performed by: CLINICAL MEDICAL LABORATORY

## 2023-09-27 PROCEDURE — 1160F RVW MEDS BY RX/DR IN RCRD: CPT | Mod: ,,, | Performed by: NURSE PRACTITIONER

## 2023-09-27 PROCEDURE — 3074F SYST BP LT 130 MM HG: CPT | Mod: ,,, | Performed by: NURSE PRACTITIONER

## 2023-09-27 PROCEDURE — 3008F BODY MASS INDEX DOCD: CPT | Mod: ,,, | Performed by: NURSE PRACTITIONER

## 2023-09-27 PROCEDURE — 4010F ACE/ARB THERAPY RXD/TAKEN: CPT | Mod: ,,, | Performed by: NURSE PRACTITIONER

## 2023-09-27 PROCEDURE — 80053 COMPREHENSIVE METABOLIC PANEL: ICD-10-PCS | Mod: ,,, | Performed by: CLINICAL MEDICAL LABORATORY

## 2023-09-27 PROCEDURE — 3074F PR MOST RECENT SYSTOLIC BLOOD PRESSURE < 130 MM HG: ICD-10-PCS | Mod: ,,, | Performed by: NURSE PRACTITIONER

## 2023-09-27 PROCEDURE — 83036 HEMOGLOBIN A1C: ICD-10-PCS | Mod: ,,, | Performed by: CLINICAL MEDICAL LABORATORY

## 2023-09-27 PROCEDURE — 83036 HEMOGLOBIN GLYCOSYLATED A1C: CPT | Mod: ,,, | Performed by: CLINICAL MEDICAL LABORATORY

## 2023-09-27 PROCEDURE — 90750 HZV VACC RECOMBINANT IM: CPT | Mod: ,,, | Performed by: NURSE PRACTITIONER

## 2023-09-27 PROCEDURE — 80061 LIPID PANEL: ICD-10-PCS | Mod: ,,, | Performed by: CLINICAL MEDICAL LABORATORY

## 2023-09-27 PROCEDURE — 4010F PR ACE/ARB THEARPY RXD/TAKEN: ICD-10-PCS | Mod: ,,, | Performed by: NURSE PRACTITIONER

## 2023-09-27 PROCEDURE — 3044F PR MOST RECENT HEMOGLOBIN A1C LEVEL <7.0%: ICD-10-PCS | Mod: ,,, | Performed by: NURSE PRACTITIONER

## 2023-09-27 PROCEDURE — 3008F PR BODY MASS INDEX (BMI) DOCUMENTED: ICD-10-PCS | Mod: ,,, | Performed by: NURSE PRACTITIONER

## 2023-09-27 PROCEDURE — 90471 IMMUNIZATION ADMIN: CPT | Mod: ,,, | Performed by: NURSE PRACTITIONER

## 2023-09-27 PROCEDURE — 90750 ZOSTER RECOMBINANT VACCINE: ICD-10-PCS | Mod: ,,, | Performed by: NURSE PRACTITIONER

## 2023-09-27 PROCEDURE — 99214 OFFICE O/P EST MOD 30 MIN: CPT | Mod: 25,,, | Performed by: NURSE PRACTITIONER

## 2023-09-27 PROCEDURE — 1159F MED LIST DOCD IN RCRD: CPT | Mod: ,,, | Performed by: NURSE PRACTITIONER

## 2023-09-27 PROCEDURE — 99214 PR OFFICE/OUTPT VISIT, EST, LEVL IV, 30-39 MIN: ICD-10-PCS | Mod: 25,,, | Performed by: NURSE PRACTITIONER

## 2023-09-27 PROCEDURE — 1159F PR MEDICATION LIST DOCUMENTED IN MEDICAL RECORD: ICD-10-PCS | Mod: ,,, | Performed by: NURSE PRACTITIONER

## 2023-09-27 RX ORDER — CYCLOBENZAPRINE HCL 10 MG
10 TABLET ORAL 3 TIMES DAILY PRN
Qty: 90 TABLET | Refills: 1 | Status: SHIPPED | OUTPATIENT
Start: 2023-09-27

## 2023-09-27 RX ORDER — LISINOPRIL AND HYDROCHLOROTHIAZIDE 12.5; 2 MG/1; MG/1
1 TABLET ORAL DAILY
Qty: 90 TABLET | Refills: 1 | Status: SHIPPED | OUTPATIENT
Start: 2023-09-27

## 2023-09-27 RX ORDER — IBUPROFEN 800 MG/1
800 TABLET ORAL 3 TIMES DAILY PRN
Qty: 90 TABLET | Refills: 1 | Status: SHIPPED | OUTPATIENT
Start: 2023-09-27

## 2023-09-27 RX ORDER — ATORVASTATIN CALCIUM 10 MG/1
10 TABLET, FILM COATED ORAL NIGHTLY
Qty: 90 TABLET | Refills: 3 | Status: SHIPPED | OUTPATIENT
Start: 2023-09-27 | End: 2023-10-03 | Stop reason: DRUGHIGH

## 2023-09-27 RX ORDER — PHENTERMINE HYDROCHLORIDE 37.5 MG/1
37.5 TABLET ORAL
Qty: 30 TABLET | Refills: 0 | Status: SHIPPED | OUTPATIENT
Start: 2023-09-27 | End: 2023-10-27

## 2023-09-27 NOTE — PROGRESS NOTES
[facility] - FAMILY MEDICINE       PATIENT NAME: Eric Gold   : 1965    AGE: 58 y.o. DATE OF ENCOUNTER: 23    MRN: [id number]      PCP: Blessing Gilliland FNP    Reason for Visit / Chief Complaint:  Follow-up (Patient presents to the clinic for 1m f/u weight management prdm hld), Diabetes, and Weight Loss         274}    Subjective:     HPI:    Presents for f/u weight management. Has gained 4 lbs since previous visit, but finished adipex rx > 2 mths ago; reports always has appetite suppression and wt loss w/ adipex.  Admits lately has been eating whatever he wants.    didn't f/u 1 mth as advised for wt management, prediabetes, and HLD with fasting labs, but has fasted to have labs this afternoon    Review of Systems:   Review of Systems   Constitutional: Negative.    HENT: Negative.     Eyes: Negative.    Respiratory: Negative.     Cardiovascular: Negative.    Gastrointestinal: Negative.    Genitourinary: Negative.    Musculoskeletal:  Positive for arthralgias.   Skin: Negative.    Neurological: Negative.    Hematological: Negative.    Psychiatric/Behavioral: Negative.         Allergies and Meds: 274}   Review of patient's allergies indicates:  No Known Allergies     Current Outpatient Medications:     atorvastatin (LIPITOR) 10 MG tablet, Take 1 tablet (10 mg total) by mouth every evening., Disp: 90 tablet, Rfl: 3    cyclobenzaprine (FLEXERIL) 10 MG tablet, Take 1 tablet (10 mg total) by mouth 3 (three) times daily as needed for Muscle spasms., Disp: 90 tablet, Rfl: 1    ibuprofen (ADVIL,MOTRIN) 800 MG tablet, Take 1 tablet (800 mg total) by mouth 3 (three) times daily as needed for Pain. Take with food. Avoid all other NSAIDs., Disp: 90 tablet, Rfl: 1    lisinopriL-hydrochlorothiazide (PRINZIDE,ZESTORETIC) 20-12.5 mg per tablet, Take 1 tablet by mouth once daily., Disp: 90 tablet, Rfl: 1    phentermine (ADIPEX-P) 37.5 mg tablet, Take 1 tablet (37.5 mg total) by mouth before  breakfast., Disp: 30 tablet, Rfl: 0    Labs:274}   I have reviewed labs below:  Lab Results   Component Value Date    WBC 7.49 10/27/2022    RBC 5.74 10/27/2022    HGB 16.9 10/27/2022    HCT 50.0 10/27/2022     10/27/2022     10/27/2022    K 4.2 10/27/2022     10/27/2022    CALCIUM 8.9 10/27/2022     10/27/2022    BUN 12 10/27/2022    CREATININE 1.02 10/27/2022    EGFRNONAA 77 10/14/2021    ALT 72 (H) 10/27/2022    AST 30 10/27/2022    CHOL 160 10/27/2022    TRIG 173 (H) 10/27/2022    HDL 35 (L) 10/27/2022    LDLCALC 90 10/27/2022    TSH 1.200 10/27/2022    PSA 1.190 10/27/2022    HGBA1C 5.9 10/27/2022       Medical History: 274}     Past Medical History:   Diagnosis Date    Anxiety     Class 1 obesity due to excess calories with body mass index (BMI) of 32.0 to 32.9 in adult 9/8/2021    History of colon polyps 09/30/2020    Dr. Segovia, repeat 5 years    Hyperlipidemia     Hypertension     Kidney stone     Nicotine dependence, cigarettes, uncomplicated       Social History     Tobacco Use   Smoking Status Every Day    Current packs/day: 1.00    Average packs/day: 1 pack/day for 37.0 years (37.0 ttl pk-yrs)    Types: Cigarettes   Smokeless Tobacco Never      Past Surgical History:   Procedure Laterality Date    APPENDECTOMY      COLONOSCOPY          Health Maintenance: 274}     Health Maintenance         Date Due Completion Date    LDCT Lung Screen Never done ---    COVID-19 Vaccine (4 - Pfizer series) 12/09/2021 10/14/2021    Influenza Vaccine (1) 09/01/2023 10/27/2022    HIV Screening 10/14/2027 (Originally 6/15/1980) ---    PROSTATE-SPECIFIC ANTIGEN 10/27/2023 10/27/2022    Override on 5/5/2020: Done (normal 0.968)    TETANUS VACCINE 02/18/2024 2/18/2014    Hemoglobin A1c (Prediabetes) 09/27/2024 9/27/2023    Lipid Panel 09/27/2024 9/27/2023    Override on 8/6/2020: Done    Colorectal Cancer Screening 09/30/2025 9/30/2020 (Done)    Override on 9/30/2020: Done (Dr. Segovia. Repeat in 5  years. Scanned into documents.)            Objective:  274}   /79 (BP Location: Right arm, Patient Position: Sitting, BP Method: Large (Automatic))   Pulse 82   Temp 98.3 °F (36.8 °C) (Oral)   Resp 20   Ht 6' (1.829 m)   Wt 108.4 kg (239 lb)   SpO2 96%   BMI 32.41 kg/m²     Wt Readings from Last 3 Encounters:   09/27/23 108.4 kg (239 lb)   06/07/23 106.6 kg (235 lb)   12/22/22 105.7 kg (233 lb)     BP Readings from Last 3 Encounters:   09/27/23 127/79   06/07/23 124/80   12/22/22 (!) 150/93     Body mass index is 32.41 kg/m².     Physical Exam  Vitals and nursing note reviewed.   Constitutional:       General: He is not in acute distress.     Appearance: Normal appearance.   HENT:      Head: Normocephalic.   Eyes:      Conjunctiva/sclera: Conjunctivae normal.   Neck:      Thyroid: No thyromegaly or thyroid tenderness.      Vascular: No carotid bruit.      Trachea: Trachea normal.   Cardiovascular:      Rate and Rhythm: Normal rate and regular rhythm.      Pulses: Normal pulses.      Heart sounds: Normal heart sounds.   Pulmonary:      Effort: Pulmonary effort is normal. No respiratory distress.      Breath sounds: Normal breath sounds.   Musculoskeletal:      Cervical back: Neck supple.      Right lower leg: No edema.      Left lower leg: No edema.   Lymphadenopathy:      Cervical: No cervical adenopathy.   Skin:     General: Skin is warm and dry.   Neurological:      Mental Status: He is alert and oriented to person, place, and time.   Psychiatric:         Mood and Affect: Mood normal.         Behavior: Behavior normal.          Assessment and Plan: 274}     1. Essential hypertension  Comments:  Controlled  Orders:  -     Comprehensive Metabolic Panel  -     lisinopriL-hydrochlorothiazide (PRINZIDE,ZESTORETIC) 20-12.5 mg per tablet; Take 1 tablet by mouth once daily.  Dispense: 90 tablet; Refill: 1    2. Prediabetes  Overview:  Lab Results   Component Value Date    HGBA1C 5.9 10/27/2022          Orders:  -     Hemoglobin A1C  -     Comprehensive Metabolic Panel    3. Obesity, Class I, BMI 30-34.9  Comments:  Phentermine Rx to be used in conjunction with healthy, reduced calorie diet and exercise.   reviewed without suspicious activity noted.  Orders:  -     phentermine (ADIPEX-P) 37.5 mg tablet; Take 1 tablet (37.5 mg total) by mouth before breakfast.  Dispense: 30 tablet; Refill: 0    4. Need for shingles vaccine  -     Zoster Recombinant Vaccine    5. Hypercholesteremia  Comments:  recheck lipids  continue statin  advised smoking cessation  Orders:  -     Lipid Panel  -     Comprehensive Metabolic Panel  -     atorvastatin (LIPITOR) 10 MG tablet; Take 1 tablet (10 mg total) by mouth every evening.  Dispense: 90 tablet; Refill: 3    6. Chronic bilateral low back pain without sciatica  Comments:  stable, prn med refills  Orders:  -     cyclobenzaprine (FLEXERIL) 10 MG tablet; Take 1 tablet (10 mg total) by mouth 3 (three) times daily as needed for Muscle spasms.  Dispense: 90 tablet; Refill: 1  -     ibuprofen (ADVIL,MOTRIN) 800 MG tablet; Take 1 tablet (800 mg total) by mouth 3 (three) times daily as needed for Pain. Take with food. Avoid all other NSAIDs.  Dispense: 90 tablet; Refill: 1       Place on list for regular dose flu shot when we get it in stock.  Declines LDCT.  Advised smoking cessation.    Return to clinic 6 mths f/u HTN, predm, HLD w/ fasting labs; and sooner as needed.    Future Appointments   Date Time Provider Department Center   4/29/2024  9:00 AM Blessing Gilliland FNP [facility]          Signature:  BUSTER Pratt

## 2023-10-03 DIAGNOSIS — E78.2 MIXED HYPERLIPIDEMIA: Primary | Chronic | ICD-10-CM

## 2023-10-03 RX ORDER — ATORVASTATIN CALCIUM 20 MG/1
20 TABLET, FILM COATED ORAL DAILY
Qty: 90 TABLET | Refills: 3 | Status: SHIPPED | OUTPATIENT
Start: 2023-10-03 | End: 2023-10-11 | Stop reason: SDUPTHER

## 2023-10-11 DIAGNOSIS — E78.2 MIXED HYPERLIPIDEMIA: Chronic | ICD-10-CM

## 2023-10-11 DIAGNOSIS — R73.03 PREDIABETES: Primary | Chronic | ICD-10-CM

## 2023-10-11 RX ORDER — METFORMIN HYDROCHLORIDE 500 MG/1
500 TABLET, EXTENDED RELEASE ORAL
Qty: 90 TABLET | Refills: 3 | Status: SHIPPED | OUTPATIENT
Start: 2023-10-11 | End: 2024-10-10

## 2023-10-11 RX ORDER — ATORVASTATIN CALCIUM 20 MG/1
20 TABLET, FILM COATED ORAL DAILY
Qty: 90 TABLET | Refills: 3 | Status: SHIPPED | OUTPATIENT
Start: 2023-10-11

## 2023-10-11 NOTE — PROGRESS NOTES
I just sent 1 year of refills of atorvastatin 10/03/2023, but I will send it again because they may have already put it back on the shelf since that was 8 days ago.  Metformin Rx sent. hand grasp, leg strength strong and equal bilaterally

## 2023-11-04 ENCOUNTER — OFFICE VISIT (OUTPATIENT)
Dept: FAMILY MEDICINE | Facility: CLINIC | Age: 58
End: 2023-11-04
Payer: COMMERCIAL

## 2023-11-04 VITALS
HEART RATE: 88 BPM | RESPIRATION RATE: 18 BRPM | OXYGEN SATURATION: 98 % | DIASTOLIC BLOOD PRESSURE: 75 MMHG | BODY MASS INDEX: 32.1 KG/M2 | TEMPERATURE: 98 F | SYSTOLIC BLOOD PRESSURE: 128 MMHG | WEIGHT: 237 LBS | HEIGHT: 72 IN

## 2023-11-04 DIAGNOSIS — J22 LOWER RESPIRATORY INFECTION: Primary | ICD-10-CM

## 2023-11-04 PROCEDURE — 3078F PR MOST RECENT DIASTOLIC BLOOD PRESSURE < 80 MM HG: ICD-10-PCS | Mod: ,,, | Performed by: FAMILY MEDICINE

## 2023-11-04 PROCEDURE — 99051 MED SERV EVE/WKEND/HOLIDAY: CPT | Mod: ,,, | Performed by: FAMILY MEDICINE

## 2023-11-04 PROCEDURE — 3078F DIAST BP <80 MM HG: CPT | Mod: ,,, | Performed by: FAMILY MEDICINE

## 2023-11-04 PROCEDURE — 99213 PR OFFICE/OUTPT VISIT, EST, LEVL III, 20-29 MIN: ICD-10-PCS | Mod: 25,,, | Performed by: FAMILY MEDICINE

## 2023-11-04 PROCEDURE — 3074F SYST BP LT 130 MM HG: CPT | Mod: ,,, | Performed by: FAMILY MEDICINE

## 2023-11-04 PROCEDURE — 96372 THER/PROPH/DIAG INJ SC/IM: CPT | Mod: ,,, | Performed by: FAMILY MEDICINE

## 2023-11-04 PROCEDURE — 1159F PR MEDICATION LIST DOCUMENTED IN MEDICAL RECORD: ICD-10-PCS | Mod: ,,, | Performed by: FAMILY MEDICINE

## 2023-11-04 PROCEDURE — 4010F ACE/ARB THERAPY RXD/TAKEN: CPT | Mod: ,,, | Performed by: FAMILY MEDICINE

## 2023-11-04 PROCEDURE — 3044F PR MOST RECENT HEMOGLOBIN A1C LEVEL <7.0%: ICD-10-PCS | Mod: ,,, | Performed by: FAMILY MEDICINE

## 2023-11-04 PROCEDURE — 3008F PR BODY MASS INDEX (BMI) DOCUMENTED: ICD-10-PCS | Mod: ,,, | Performed by: FAMILY MEDICINE

## 2023-11-04 PROCEDURE — 3008F BODY MASS INDEX DOCD: CPT | Mod: ,,, | Performed by: FAMILY MEDICINE

## 2023-11-04 PROCEDURE — 4010F PR ACE/ARB THEARPY RXD/TAKEN: ICD-10-PCS | Mod: ,,, | Performed by: FAMILY MEDICINE

## 2023-11-04 PROCEDURE — 1159F MED LIST DOCD IN RCRD: CPT | Mod: ,,, | Performed by: FAMILY MEDICINE

## 2023-11-04 PROCEDURE — 3044F HG A1C LEVEL LT 7.0%: CPT | Mod: ,,, | Performed by: FAMILY MEDICINE

## 2023-11-04 PROCEDURE — 99213 OFFICE O/P EST LOW 20 MIN: CPT | Mod: 25,,, | Performed by: FAMILY MEDICINE

## 2023-11-04 PROCEDURE — 96372 PR INJECTION,THERAP/PROPH/DIAG2ST, IM OR SUBCUT: ICD-10-PCS | Mod: ,,, | Performed by: FAMILY MEDICINE

## 2023-11-04 PROCEDURE — 99051 PR MEDICAL SERVICES, EVE/WKEND/HOLIDAY: ICD-10-PCS | Mod: ,,, | Performed by: FAMILY MEDICINE

## 2023-11-04 PROCEDURE — 3074F PR MOST RECENT SYSTOLIC BLOOD PRESSURE < 130 MM HG: ICD-10-PCS | Mod: ,,, | Performed by: FAMILY MEDICINE

## 2023-11-04 RX ORDER — DEXAMETHASONE SODIUM PHOSPHATE 4 MG/ML
6 INJECTION, SOLUTION INTRA-ARTICULAR; INTRALESIONAL; INTRAMUSCULAR; INTRAVENOUS; SOFT TISSUE
Status: COMPLETED | OUTPATIENT
Start: 2023-11-04 | End: 2023-11-04

## 2023-11-04 RX ORDER — PREDNISONE 20 MG/1
TABLET ORAL
Qty: 10 TABLET | Refills: 0 | Status: SHIPPED | OUTPATIENT
Start: 2023-11-04

## 2023-11-04 RX ORDER — AZITHROMYCIN 250 MG/1
TABLET, FILM COATED ORAL
Qty: 6 TABLET | Refills: 0 | Status: SHIPPED | OUTPATIENT
Start: 2023-11-04

## 2023-11-04 RX ORDER — CEFTRIAXONE 500 MG/1
500 INJECTION, POWDER, FOR SOLUTION INTRAMUSCULAR; INTRAVENOUS
Status: COMPLETED | OUTPATIENT
Start: 2023-11-04 | End: 2023-11-04

## 2023-11-04 RX ADMIN — CEFTRIAXONE 500 MG: 500 INJECTION, POWDER, FOR SOLUTION INTRAMUSCULAR; INTRAVENOUS at 01:11

## 2023-11-04 RX ADMIN — DEXAMETHASONE SODIUM PHOSPHATE 6 MG: 4 INJECTION, SOLUTION INTRA-ARTICULAR; INTRALESIONAL; INTRAMUSCULAR; INTRAVENOUS; SOFT TISSUE at 01:11

## 2023-11-04 NOTE — PROGRESS NOTES
Subjective     Patient ID: Eric Gold is a 58 y.o. male.    Chief Complaint: Sore Throat (xThursday. No known fever. ), Headache, sinus pressure, Back Pain, Nasal Congestion, and COVID-19 Concerns (Pt states he took a at home covid test Thursday and this morning and it was negative. )    No fever chills.    Sore Throat   Associated symptoms include headaches.   Headache   Associated symptoms include back pain and a sore throat.   Back Pain  Associated symptoms include headaches.     Review of Systems   HENT:  Positive for sore throat.    Musculoskeletal:  Positive for back pain.   Neurological:  Positive for headaches.          Objective     Physical Exam  Constitutional:       Appearance: He is ill-appearing. He is not toxic-appearing.   HENT:      Right Ear: Tympanic membrane normal.      Left Ear: Tympanic membrane normal.      Nose: Congestion and rhinorrhea present.      Mouth/Throat:      Pharynx: No posterior oropharyngeal erythema.   Cardiovascular:      Rate and Rhythm: Normal rate and regular rhythm.   Pulmonary:      Effort: Pulmonary effort is normal.      Breath sounds: Wheezing (Faint scattered rhonchi and wheezes) and rhonchi present.   Neurological:      Mental Status: He is alert.          Assessment and Plan     1. Lower respiratory infection    Other orders  -     dexAMETHasone injection 6 mg  -     cefTRIAXone injection 500 mg  -     predniSONE (DELTASONE) 20 MG tablet; Two every morning  Dispense: 10 tablet; Refill: 0  -     azithromycin (ZITHROMAX Z-JESSA) 250 MG tablet; Two today then 1 daily  Dispense: 6 tablet; Refill: 0        Ceftriaxone at patient's request for an antibiotic shot  Call if not much better in 2 days       No follow-ups on file.

## 2024-03-27 ENCOUNTER — OFFICE VISIT (OUTPATIENT)
Dept: FAMILY MEDICINE | Facility: CLINIC | Age: 59
End: 2024-03-27
Payer: COMMERCIAL

## 2024-03-27 VITALS
RESPIRATION RATE: 18 BRPM | DIASTOLIC BLOOD PRESSURE: 97 MMHG | HEIGHT: 72 IN | BODY MASS INDEX: 31.42 KG/M2 | HEART RATE: 72 BPM | SYSTOLIC BLOOD PRESSURE: 167 MMHG | OXYGEN SATURATION: 95 % | WEIGHT: 232 LBS | TEMPERATURE: 98 F

## 2024-03-27 DIAGNOSIS — R05.9 COUGH, UNSPECIFIED TYPE: ICD-10-CM

## 2024-03-27 DIAGNOSIS — J32.9 SINUSITIS, UNSPECIFIED CHRONICITY, UNSPECIFIED LOCATION: Primary | ICD-10-CM

## 2024-03-27 LAB
CTP QC/QA: YES
FLUAV AG NPH QL: NEGATIVE
FLUBV AG NPH QL: NEGATIVE
MOLECULAR STREP A: NEGATIVE
POC MOLECULAR INFLUENZA A AGN: NEGATIVE
POC MOLECULAR INFLUENZA B AGN: NEGATIVE

## 2024-03-27 PROCEDURE — 1159F MED LIST DOCD IN RCRD: CPT | Mod: ,,, | Performed by: FAMILY MEDICINE

## 2024-03-27 PROCEDURE — 3080F DIAST BP >= 90 MM HG: CPT | Mod: ,,, | Performed by: FAMILY MEDICINE

## 2024-03-27 PROCEDURE — 3008F BODY MASS INDEX DOCD: CPT | Mod: ,,, | Performed by: FAMILY MEDICINE

## 2024-03-27 PROCEDURE — 96372 THER/PROPH/DIAG INJ SC/IM: CPT | Mod: ,,, | Performed by: FAMILY MEDICINE

## 2024-03-27 PROCEDURE — 4010F ACE/ARB THERAPY RXD/TAKEN: CPT | Mod: ,,, | Performed by: FAMILY MEDICINE

## 2024-03-27 PROCEDURE — 87502 INFLUENZA DNA AMP PROBE: CPT | Mod: QW,,, | Performed by: FAMILY MEDICINE

## 2024-03-27 PROCEDURE — 99214 OFFICE O/P EST MOD 30 MIN: CPT | Mod: 25,,, | Performed by: FAMILY MEDICINE

## 2024-03-27 PROCEDURE — 3077F SYST BP >= 140 MM HG: CPT | Mod: ,,, | Performed by: FAMILY MEDICINE

## 2024-03-27 PROCEDURE — 87651 STREP A DNA AMP PROBE: CPT | Mod: QW,,, | Performed by: FAMILY MEDICINE

## 2024-03-27 PROCEDURE — 1160F RVW MEDS BY RX/DR IN RCRD: CPT | Mod: ,,, | Performed by: FAMILY MEDICINE

## 2024-03-27 RX ORDER — PREDNISONE 20 MG/1
40 TABLET ORAL DAILY
Qty: 10 TABLET | Refills: 0 | Status: SHIPPED | OUTPATIENT
Start: 2024-03-27 | End: 2024-04-29 | Stop reason: ALTCHOICE

## 2024-03-27 RX ORDER — DEXAMETHASONE SODIUM PHOSPHATE 4 MG/ML
6 INJECTION, SOLUTION INTRA-ARTICULAR; INTRALESIONAL; INTRAMUSCULAR; INTRAVENOUS; SOFT TISSUE
Status: COMPLETED | OUTPATIENT
Start: 2024-03-27 | End: 2024-03-27

## 2024-03-27 RX ORDER — AMOXICILLIN AND CLAVULANATE POTASSIUM 875; 125 MG/1; MG/1
1 TABLET, FILM COATED ORAL 2 TIMES DAILY
Qty: 14 TABLET | Refills: 0 | Status: SHIPPED | OUTPATIENT
Start: 2024-03-27 | End: 2024-04-29 | Stop reason: ALTCHOICE

## 2024-03-27 RX ORDER — BENZONATATE 100 MG/1
100 CAPSULE ORAL 3 TIMES DAILY PRN
Qty: 20 CAPSULE | Refills: 0 | Status: SHIPPED | OUTPATIENT
Start: 2024-03-27 | End: 2024-04-29

## 2024-03-27 RX ORDER — CEFTRIAXONE 500 MG/1
500 INJECTION, POWDER, FOR SOLUTION INTRAMUSCULAR; INTRAVENOUS
Status: COMPLETED | OUTPATIENT
Start: 2024-03-27 | End: 2024-03-27

## 2024-03-27 RX ADMIN — DEXAMETHASONE SODIUM PHOSPHATE 6 MG: 4 INJECTION, SOLUTION INTRA-ARTICULAR; INTRALESIONAL; INTRAMUSCULAR; INTRAVENOUS; SOFT TISSUE at 09:03

## 2024-03-27 RX ADMIN — CEFTRIAXONE 500 MG: 500 INJECTION, POWDER, FOR SOLUTION INTRAMUSCULAR; INTRAVENOUS at 09:03

## 2024-03-27 NOTE — PROGRESS NOTES
Subjective:       Patient ID: Eric Gold is a 58 y.o. male.    Chief Complaint: Sinusitis (X5 days. Took 2 at home covid test one Friday and one Tuesday both negative.), Nasal Congestion, and Cough (On and off. Sore throat when done coughing.)    Sinusitis  Associated symptoms include congestion, coughing and sinus pressure. Pertinent negatives include no chills, diaphoresis, ear pain, headaches, neck pain, shortness of breath, sneezing or sore throat.   Cough  Associated symptoms include postnasal drip and rhinorrhea. Pertinent negatives include no chest pain, chills, ear pain, eye redness, fever, headaches, myalgias, rash, sore throat, shortness of breath or wheezing. There is no history of environmental allergies.     Review of Systems   Constitutional:  Negative for activity change, appetite change, chills, diaphoresis, fatigue, fever and unexpected weight change.   HENT:  Positive for nasal congestion, postnasal drip, rhinorrhea and sinus pressure/congestion. Negative for dental problem, drooling, ear discharge, ear pain, facial swelling, hearing loss, mouth sores, nosebleeds, sneezing, sore throat, tinnitus, trouble swallowing, voice change and goiter.    Eyes:  Negative for photophobia, pain, discharge, redness, itching and visual disturbance.   Respiratory:  Positive for cough. Negative for apnea, choking, chest tightness, shortness of breath, wheezing and stridor.    Cardiovascular:  Negative for chest pain, palpitations, leg swelling and claudication.   Gastrointestinal:  Negative for abdominal distention, abdominal pain, anal bleeding, blood in stool, change in bowel habit, constipation, diarrhea, nausea, vomiting, reflux and fecal incontinence.   Endocrine: Negative for cold intolerance, heat intolerance, polydipsia, polyphagia and polyuria.   Genitourinary:  Negative for bladder incontinence, decreased urine volume, difficulty urinating, discharge, dysuria, enuresis, erectile dysfunction, flank  pain, frequency, genital sores, hematuria, penile pain, testicular pain and urgency.   Musculoskeletal:  Negative for arthralgias, back pain, gait problem, joint swelling, leg pain, myalgias, neck pain, neck stiffness and joint deformity.   Integumentary:  Negative for pallor, rash, wound and mole/lesion.   Allergic/Immunologic: Negative for environmental allergies, food allergies and frequent infections.   Neurological:  Negative for dizziness, vertigo, tremors, seizures, syncope, facial asymmetry, speech difficulty, weakness, light-headedness, numbness, headaches, memory loss and coordination difficulties.   Hematological:  Negative for adenopathy. Does not bruise/bleed easily.   Psychiatric/Behavioral:  Negative for agitation, behavioral problems, confusion, decreased concentration, dysphoric mood, hallucinations, self-injury, sleep disturbance and suicidal ideas. The patient is not nervous/anxious and is not hyperactive.          Objective:      Physical Exam  Vitals reviewed.   Constitutional:       Appearance: Normal appearance. He is normal weight.   HENT:      Head: Normocephalic and atraumatic.      Right Ear: Tympanic membrane and ear canal normal.      Left Ear: Tympanic membrane, ear canal and external ear normal.      Nose: Congestion and rhinorrhea present.      Mouth/Throat:      Mouth: Mucous membranes are moist.      Pharynx: Oropharynx is clear. Posterior oropharyngeal erythema present.   Eyes:      Extraocular Movements: Extraocular movements intact.      Conjunctiva/sclera: Conjunctivae normal.      Pupils: Pupils are equal, round, and reactive to light.   Cardiovascular:      Rate and Rhythm: Normal rate and regular rhythm.      Pulses: Normal pulses.      Heart sounds: Normal heart sounds.   Pulmonary:      Effort: Pulmonary effort is normal.      Breath sounds: Normal breath sounds.   Abdominal:      General: Abdomen is flat. Bowel sounds are normal.      Palpations: Abdomen is soft.    Musculoskeletal:         General: Normal range of motion.      Cervical back: Normal range of motion and neck supple.   Skin:     General: Skin is warm and dry.   Neurological:      General: No focal deficit present.      Mental Status: He is alert and oriented to person, place, and time. Mental status is at baseline.   Psychiatric:         Mood and Affect: Mood normal.         Behavior: Behavior normal.         Thought Content: Thought content normal.         Judgment: Judgment normal.         Assessment:       1. Sinusitis, unspecified chronicity, unspecified location    2. Cough, unspecified type        Plan:     Sinusitis, unspecified chronicity, unspecified location  -     cefTRIAXone injection 500 mg  -     dexAMETHasone injection 6 mg  -     amoxicillin-clavulanate 875-125mg (AUGMENTIN) 875-125 mg per tablet; Take 1 tablet by mouth 2 (two) times a day. for 7 days  Dispense: 14 tablet; Refill: 0  -     benzonatate (TESSALON) 100 MG capsule; Take 1 capsule (100 mg total) by mouth 3 (three) times daily as needed for Cough.  Dispense: 20 capsule; Refill: 0  -     predniSONE (DELTASONE) 20 MG tablet; Take 2 tablets (40 mg total) by mouth once daily. for 5 days  Dispense: 10 tablet; Refill: 0    Cough, unspecified type  -     POCT Influenza A/B  -     POCT Strep A, Molecular

## 2024-04-29 ENCOUNTER — OFFICE VISIT (OUTPATIENT)
Dept: FAMILY MEDICINE | Facility: CLINIC | Age: 59
End: 2024-04-29
Payer: COMMERCIAL

## 2024-04-29 VITALS
WEIGHT: 234.81 LBS | BODY MASS INDEX: 31.8 KG/M2 | HEIGHT: 72 IN | RESPIRATION RATE: 18 BRPM | SYSTOLIC BLOOD PRESSURE: 137 MMHG | HEART RATE: 70 BPM | DIASTOLIC BLOOD PRESSURE: 79 MMHG | TEMPERATURE: 98 F | OXYGEN SATURATION: 97 %

## 2024-04-29 DIAGNOSIS — Z11.4 SCREENING FOR HIV (HUMAN IMMUNODEFICIENCY VIRUS): ICD-10-CM

## 2024-04-29 DIAGNOSIS — Z23 NEED FOR DIPHTHERIA-TETANUS-PERTUSSIS (TDAP) VACCINE: ICD-10-CM

## 2024-04-29 DIAGNOSIS — Z13.220 SCREENING FOR LIPOID DISORDERS: ICD-10-CM

## 2024-04-29 DIAGNOSIS — M54.50 CHRONIC BILATERAL LOW BACK PAIN WITHOUT SCIATICA: Chronic | ICD-10-CM

## 2024-04-29 DIAGNOSIS — Z13.1 SCREENING FOR DIABETES MELLITUS: ICD-10-CM

## 2024-04-29 DIAGNOSIS — Z79.899 ENCOUNTER FOR LONG-TERM (CURRENT) USE OF OTHER MEDICATIONS: ICD-10-CM

## 2024-04-29 DIAGNOSIS — G89.29 CHRONIC BILATERAL LOW BACK PAIN WITHOUT SCIATICA: Chronic | ICD-10-CM

## 2024-04-29 DIAGNOSIS — E66.9 OBESITY, CLASS I, BMI 30-34.9: Chronic | ICD-10-CM

## 2024-04-29 DIAGNOSIS — I10 ESSENTIAL HYPERTENSION: Chronic | ICD-10-CM

## 2024-04-29 DIAGNOSIS — E78.2 MIXED HYPERLIPIDEMIA: Chronic | ICD-10-CM

## 2024-04-29 DIAGNOSIS — R73.03 PREDIABETES: Chronic | ICD-10-CM

## 2024-04-29 DIAGNOSIS — Z12.5 PROSTATE CANCER SCREENING: ICD-10-CM

## 2024-04-29 DIAGNOSIS — Z00.00 ROUTINE GENERAL MEDICAL EXAMINATION AT A HEALTH CARE FACILITY: Primary | ICD-10-CM

## 2024-04-29 LAB
ALBUMIN SERPL BCP-MCNC: 3.9 G/DL (ref 3.5–5)
ALBUMIN/GLOB SERPL: 1.1 {RATIO}
ALP SERPL-CCNC: 106 U/L (ref 45–115)
ALT SERPL W P-5'-P-CCNC: 60 U/L (ref 16–61)
ANION GAP SERPL CALCULATED.3IONS-SCNC: 10 MMOL/L (ref 7–16)
AST SERPL W P-5'-P-CCNC: 32 U/L (ref 15–37)
BASOPHILS # BLD AUTO: 0.05 K/UL (ref 0–0.2)
BASOPHILS NFR BLD AUTO: 0.6 % (ref 0–1)
BILIRUB SERPL-MCNC: 0.5 MG/DL (ref ?–1.2)
BUN SERPL-MCNC: 10 MG/DL (ref 7–18)
BUN/CREAT SERPL: 11 (ref 6–20)
CALCIUM SERPL-MCNC: 9.4 MG/DL (ref 8.5–10.1)
CHLORIDE SERPL-SCNC: 106 MMOL/L (ref 98–107)
CHOLEST SERPL-MCNC: 178 MG/DL (ref 0–200)
CHOLEST/HDLC SERPL: 5.2 {RATIO}
CO2 SERPL-SCNC: 29 MMOL/L (ref 21–32)
CREAT SERPL-MCNC: 0.88 MG/DL (ref 0.7–1.3)
CREAT UR-MCNC: 127 MG/DL (ref 39–259)
DIFFERENTIAL METHOD BLD: ABNORMAL
EGFR (NO RACE VARIABLE) (RUSH/TITUS): 99 ML/MIN/1.73M2
EOSINOPHIL # BLD AUTO: 0.1 K/UL (ref 0–0.5)
EOSINOPHIL NFR BLD AUTO: 1.1 % (ref 1–4)
ERYTHROCYTE [DISTWIDTH] IN BLOOD BY AUTOMATED COUNT: 12.8 % (ref 11.5–14.5)
GLOBULIN SER-MCNC: 3.4 G/DL (ref 2–4)
GLUCOSE SERPL-MCNC: 84 MG/DL (ref 74–106)
HCT VFR BLD AUTO: 46.6 % (ref 40–54)
HDLC SERPL-MCNC: 34 MG/DL (ref 40–60)
HGB BLD-MCNC: 15.2 G/DL (ref 13.5–18)
HIV 1+O+2 AB SERPL QL: NORMAL
IMM GRANULOCYTES # BLD AUTO: 0.03 K/UL (ref 0–0.04)
IMM GRANULOCYTES NFR BLD: 0.3 % (ref 0–0.4)
LDLC SERPL CALC-MCNC: 108 MG/DL
LDLC/HDLC SERPL: 3.2 {RATIO}
LYMPHOCYTES # BLD AUTO: 2.81 K/UL (ref 1–4.8)
LYMPHOCYTES NFR BLD AUTO: 30.9 % (ref 27–41)
MCH RBC QN AUTO: 29.1 PG (ref 27–31)
MCHC RBC AUTO-ENTMCNC: 32.6 G/DL (ref 32–36)
MCV RBC AUTO: 89.3 FL (ref 80–96)
MICROALBUMIN UR-MCNC: 0.7 MG/DL (ref 0–2.8)
MICROALBUMIN/CREAT RATIO PNL UR: 5.5 MG/G (ref 0–30)
MONOCYTES # BLD AUTO: 0.73 K/UL (ref 0–0.8)
MONOCYTES NFR BLD AUTO: 8 % (ref 2–6)
MPC BLD CALC-MCNC: 10.2 FL (ref 9.4–12.4)
NEUTROPHILS # BLD AUTO: 5.37 K/UL (ref 1.8–7.7)
NEUTROPHILS NFR BLD AUTO: 59.1 % (ref 53–65)
NONHDLC SERPL-MCNC: 144 MG/DL
NRBC # BLD AUTO: 0 X10E3/UL
NRBC, AUTO (.00): 0 %
PLATELET # BLD AUTO: 333 K/UL (ref 150–400)
POTASSIUM SERPL-SCNC: 4.3 MMOL/L (ref 3.5–5.1)
PROT SERPL-MCNC: 7.3 G/DL (ref 6.4–8.2)
PSA SERPL-MCNC: 1.52 NG/ML
RBC # BLD AUTO: 5.22 M/UL (ref 4.6–6.2)
SODIUM SERPL-SCNC: 141 MMOL/L (ref 136–145)
TRIGL SERPL-MCNC: 181 MG/DL (ref 35–150)
TSH SERPL DL<=0.005 MIU/L-ACNC: 1.34 UIU/ML (ref 0.36–3.74)
VLDLC SERPL-MCNC: 36 MG/DL
WBC # BLD AUTO: 9.09 K/UL (ref 4.5–11)

## 2024-04-29 PROCEDURE — G0103 PSA SCREENING: HCPCS | Mod: ,,, | Performed by: CLINICAL MEDICAL LABORATORY

## 2024-04-29 PROCEDURE — 1160F RVW MEDS BY RX/DR IN RCRD: CPT | Mod: ,,, | Performed by: NURSE PRACTITIONER

## 2024-04-29 PROCEDURE — 87389 HIV-1 AG W/HIV-1&-2 AB AG IA: CPT | Mod: ,,, | Performed by: CLINICAL MEDICAL LABORATORY

## 2024-04-29 PROCEDURE — 82570 ASSAY OF URINE CREATININE: CPT | Mod: ,,, | Performed by: CLINICAL MEDICAL LABORATORY

## 2024-04-29 PROCEDURE — 82043 UR ALBUMIN QUANTITATIVE: CPT | Mod: ,,, | Performed by: CLINICAL MEDICAL LABORATORY

## 2024-04-29 PROCEDURE — 3066F NEPHROPATHY DOC TX: CPT | Mod: ,,, | Performed by: NURSE PRACTITIONER

## 2024-04-29 PROCEDURE — 1159F MED LIST DOCD IN RCRD: CPT | Mod: ,,, | Performed by: NURSE PRACTITIONER

## 2024-04-29 PROCEDURE — 90471 IMMUNIZATION ADMIN: CPT | Mod: ,,, | Performed by: NURSE PRACTITIONER

## 2024-04-29 PROCEDURE — 83036 HEMOGLOBIN GLYCOSYLATED A1C: CPT | Mod: ,,, | Performed by: CLINICAL MEDICAL LABORATORY

## 2024-04-29 PROCEDURE — 80050 GENERAL HEALTH PANEL: CPT | Mod: ,,, | Performed by: CLINICAL MEDICAL LABORATORY

## 2024-04-29 PROCEDURE — 3075F SYST BP GE 130 - 139MM HG: CPT | Mod: ,,, | Performed by: NURSE PRACTITIONER

## 2024-04-29 PROCEDURE — 4010F ACE/ARB THERAPY RXD/TAKEN: CPT | Mod: ,,, | Performed by: NURSE PRACTITIONER

## 2024-04-29 PROCEDURE — 3061F NEG MICROALBUMINURIA REV: CPT | Mod: ,,, | Performed by: NURSE PRACTITIONER

## 2024-04-29 PROCEDURE — 99396 PREV VISIT EST AGE 40-64: CPT | Mod: 25,,, | Performed by: NURSE PRACTITIONER

## 2024-04-29 PROCEDURE — 3078F DIAST BP <80 MM HG: CPT | Mod: ,,, | Performed by: NURSE PRACTITIONER

## 2024-04-29 PROCEDURE — 3008F BODY MASS INDEX DOCD: CPT | Mod: ,,, | Performed by: NURSE PRACTITIONER

## 2024-04-29 PROCEDURE — 90715 TDAP VACCINE 7 YRS/> IM: CPT | Mod: ,,, | Performed by: NURSE PRACTITIONER

## 2024-04-29 PROCEDURE — 80061 LIPID PANEL: CPT | Mod: ,,, | Performed by: CLINICAL MEDICAL LABORATORY

## 2024-04-29 RX ORDER — ATORVASTATIN CALCIUM 20 MG/1
20 TABLET, FILM COATED ORAL DAILY
Qty: 90 TABLET | Refills: 3 | Status: SHIPPED | OUTPATIENT
Start: 2024-04-29

## 2024-04-29 RX ORDER — PHENTERMINE HYDROCHLORIDE 37.5 MG/1
37.5 TABLET ORAL
Qty: 30 TABLET | Refills: 0 | Status: SHIPPED | OUTPATIENT
Start: 2024-05-06 | End: 2024-06-06 | Stop reason: SDUPTHER

## 2024-04-29 RX ORDER — IBUPROFEN 800 MG/1
800 TABLET ORAL 3 TIMES DAILY PRN
Qty: 90 TABLET | Refills: 1 | Status: SHIPPED | OUTPATIENT
Start: 2024-04-29

## 2024-04-29 RX ORDER — LISINOPRIL AND HYDROCHLOROTHIAZIDE 12.5; 2 MG/1; MG/1
1 TABLET ORAL DAILY
Qty: 90 TABLET | Refills: 1 | Status: SHIPPED | OUTPATIENT
Start: 2024-04-29

## 2024-04-29 RX ORDER — CYCLOBENZAPRINE HCL 10 MG
10 TABLET ORAL 3 TIMES DAILY PRN
Qty: 90 TABLET | Refills: 1 | Status: SHIPPED | OUTPATIENT
Start: 2024-04-29

## 2024-04-29 NOTE — PROGRESS NOTES
[facility] - FAMILY MEDICINE       PATIENT NAME: Eric Gold   : 1965    AGE: 58 y.o. DATE OF ENCOUNTER: 24    MRN: [id number]    Subjective     Patient ID: Eric Gold is a 58 y.o. male.    Chief Complaint: Pre-diabetes, Hypertension, and Follow-up (Patient presents to clinic for 6 month follow up of HTN and pre-diabetes.  Patient is fasting. )    HPI  Presents for healthy You wellness visit and f/u HTN, HLD, and prediabetes.   Doing okay. Admits not taking metformin ER consistently.  Requesting refills of ibuprofen and cyclobenzaprine for LBP.    Wants to take adipex again in conjunction with healthy diet and exercise, but advised only has 1 more rx available for year from rx 23 and 23; and he must f/u as the end of each month to evaluate weight loss and determine if benefit > risk to continue/refill for 3 mths max.  Wants to go ahead and get Rx now and will wait one-week to start it then f/u 2024.    Review of Systems   Constitutional: Negative.    HENT: Negative.     Respiratory: Negative.     Cardiovascular: Negative.    Gastrointestinal: Negative.    Integumentary:  Negative.   Neurological: Negative.        Tobacco Use: High Risk (2024)    Patient History     Smoking Tobacco Use: Every Day     Smokeless Tobacco Use: Never     Passive Exposure: Not on file     Review of patient's allergies indicates:  No Known Allergies  Current Outpatient Medications   Medication Instructions    atorvastatin (LIPITOR) 20 mg, Oral, Daily    cyclobenzaprine (FLEXERIL) 10 mg, Oral, 3 times daily PRN    ibuprofen (ADVIL,MOTRIN) 800 mg, Oral, 3 times daily PRN, Take with food. Avoid all other NSAIDs.    lisinopriL-hydrochlorothiazide (PRINZIDE,ZESTORETIC) 20-12.5 mg per tablet 1 tablet, Oral, Daily    metFORMIN (GLUCOPHAGE-XR) 500 mg, Oral, With dinner    [START ON 2024] phentermine (ADIPEX-P) 37.5 mg, Oral, Before breakfast     Medications Discontinued During This  Encounter   Medication Reason    predniSONE (DELTASONE) 20 MG tablet Therapy completed    amoxicillin-clavulanate 875-125mg (AUGMENTIN) 875-125 mg per tablet Therapy completed    azithromycin (ZITHROMAX Z-JESSA) 250 MG tablet Therapy completed    predniSONE (DELTASONE) 20 MG tablet Therapy completed    benzonatate (TESSALON) 100 MG capsule Patient no longer taking    cyclobenzaprine (FLEXERIL) 10 MG tablet Reorder    ibuprofen (ADVIL,MOTRIN) 800 MG tablet Reorder    lisinopriL-hydrochlorothiazide (PRINZIDE,ZESTORETIC) 20-12.5 mg per tablet Reorder    atorvastatin (LIPITOR) 20 MG tablet Reorder       Past Medical History:   Diagnosis Date    Anxiety     Class 1 obesity due to excess calories with body mass index (BMI) of 32.0 to 32.9 in adult 9/8/2021    History of colon polyps 09/30/2020    Dr. Segovia, repeat 5 years    Hyperlipidemia     Hypertension     Kidney stone     Nicotine dependence, cigarettes, uncomplicated      Health Maintenance Topics with due status: Not Due       Topic Last Completion Date    Colorectal Cancer Screening 09/30/2020    Hemoglobin A1c (Prediabetes) 09/27/2023    PROSTATE-SPECIFIC ANTIGEN 04/29/2024    TETANUS VACCINE 04/29/2024    High Dose Statin 04/29/2024    Lipid Panel 04/29/2024     Immunization History   Administered Date(s) Administered    COVID-19, MRNA, LN-S, PF (Pfizer) (Purple Cap) 02/12/2021, 03/19/2021, 10/14/2021    Influenza 10/01/2020, 10/14/2021    Influenza - Quadrivalent - PF *Preferred* (6 months and older) 10/27/2022    Pneumococcal Conjugate - 20 Valent 10/27/2022    Pneumococcal Polysaccharide - 23 Valent 09/08/2021    Tdap 02/18/2014, 04/29/2024    Zoster Recombinant 06/07/2023, 09/27/2023       Objective     Body mass index is 31.84 kg/m².  Wt Readings from Last 3 Encounters:   04/29/24 106.5 kg (234 lb 12.8 oz)   03/27/24 105.2 kg (232 lb)   11/04/23 107.5 kg (237 lb)     Ht Readings from Last 3 Encounters:   04/29/24 6' (1.829 m)   03/27/24 6' (1.829 m)    11/04/23 6' (1.829 m)     BP Readings from Last 3 Encounters:   04/29/24 137/79   03/27/24 (!) 167/97   11/04/23 128/75     Temp Readings from Last 3 Encounters:   04/29/24 97.5 °F (36.4 °C) (Oral)   03/27/24 97.5 °F (36.4 °C)   11/04/23 98.2 °F (36.8 °C) (Oral)     Pulse Readings from Last 3 Encounters:   04/29/24 70   03/27/24 72   11/04/23 88     Resp Readings from Last 3 Encounters:   04/29/24 18   03/27/24 18   11/04/23 18     PF Readings from Last 3 Encounters:   No data found for PF       Physical Exam  Vitals and nursing note reviewed.   Constitutional:       General: He is not in acute distress.     Appearance: Normal appearance. He is not ill-appearing.   HENT:      Head: Normocephalic.      Right Ear: Tympanic membrane, ear canal and external ear normal.      Left Ear: Tympanic membrane, ear canal and external ear normal.      Nose: Nose normal.      Mouth/Throat:      Mouth: Mucous membranes are moist.      Pharynx: Oropharynx is clear. Uvula midline. No posterior oropharyngeal erythema or uvula swelling.   Eyes:      Conjunctiva/sclera: Conjunctivae normal.      Pupils: Pupils are equal, round, and reactive to light.   Neck:      Thyroid: No thyromegaly.      Vascular: Normal carotid pulses. No carotid bruit.   Cardiovascular:      Rate and Rhythm: Normal rate and regular rhythm.      Pulses: Normal pulses.      Heart sounds: Normal heart sounds.   Pulmonary:      Effort: Pulmonary effort is normal. No respiratory distress.      Breath sounds: Normal breath sounds. No wheezing, rhonchi or rales.   Abdominal:      Palpations: Abdomen is soft.      Tenderness: There is no abdominal tenderness.   Musculoskeletal:      Cervical back: Neck supple.      Right lower leg: No edema.      Left lower leg: No edema.   Lymphadenopathy:      Cervical: No cervical adenopathy.   Skin:     General: Skin is warm and dry.      Capillary Refill: Capillary refill takes less than 2 seconds.      Coloration: Skin is not  jaundiced or pale.   Neurological:      General: No focal deficit present.      Mental Status: He is alert and oriented to person, place, and time.      Gait: Gait normal.   Psychiatric:         Mood and Affect: Mood normal.         Behavior: Behavior normal.         Assessment and Plan     1. Routine general medical examination at a health care facility    2. Screening for diabetes mellitus    3. Screening for lipoid disorders  -     Lipid Panel; Future; Expected date: 04/29/2024    4. Prostate cancer screening  -     PSA, Screening; Future; Expected date: 04/29/2024    5. Screening for HIV (human immunodeficiency virus)  -     HIV 1/2 Ag/Ab (4th Gen); Future; Expected date: 04/29/2024    6. Essential hypertension  -     Microalbumin/Creatinine Ratio, Urine; Future; Expected date: 04/29/2024  -     lisinopriL-hydrochlorothiazide (PRINZIDE,ZESTORETIC) 20-12.5 mg per tablet; Take 1 tablet by mouth once daily.  Dispense: 90 tablet; Refill: 1    7. Mixed hyperlipidemia  -     Comprehensive Metabolic Panel; Future; Expected date: 04/29/2024  -     atorvastatin (LIPITOR) 20 MG tablet; Take 1 tablet (20 mg total) by mouth once daily.  Dispense: 90 tablet; Refill: 3    8. Prediabetes  Overview:  Lab Results   Component Value Date    HGBA1C 5.9 10/27/2022         Orders:  -     Comprehensive Metabolic Panel; Future; Expected date: 04/29/2024  -     Microalbumin/Creatinine Ratio, Urine; Future; Expected date: 04/29/2024  -     Hemoglobin A1C; Future; Expected date: 04/29/2024    9. Encounter for long-term (current) use of other medications  -     CBC Auto Differential; Future; Expected date: 04/29/2024  -     Comprehensive Metabolic Panel; Future; Expected date: 04/29/2024  -     TSH; Future; Expected date: 04/29/2024    10. Chronic bilateral low back pain without sciatica  Comments:  stable, prn med refills  Orders:  -     cyclobenzaprine (FLEXERIL) 10 MG tablet; Take 1 tablet (10 mg total) by mouth 3 (three) times daily as  needed for Muscle spasms.  Dispense: 90 tablet; Refill: 1  -     ibuprofen (ADVIL,MOTRIN) 800 MG tablet; Take 1 tablet (800 mg total) by mouth 3 (three) times daily as needed for Pain. Take with food. Avoid all other NSAIDs.  Dispense: 90 tablet; Refill: 1    11. Essential hypertension  Comments:  Controlled  Orders:  -     Microalbumin/Creatinine Ratio, Urine; Future; Expected date: 04/29/2024  -     lisinopriL-hydrochlorothiazide (PRINZIDE,ZESTORETIC) 20-12.5 mg per tablet; Take 1 tablet by mouth once daily.  Dispense: 90 tablet; Refill: 1    12. Obesity, Class I, BMI 30-34.9  Assessment & Plan:  Body mass index is 31.84 kg/m². Morbid obesity complicates all aspects of disease management from diagnostic modalities to treatment. Weight loss encouraged and health benefits explained to patient.     His insurance does not cover long-term weight loss meds and he would like to try short-term Adipex again which has been beneficial in the past.    Advised adipex is intended for short term use only with max of 3 mths tx. Discussed potential risks and side effects of adipex - dry mouth, flushing/sweating, increased HR/palpitations, increased BP, or possible heart rhythm disturbance.   Rx requires monthly visit to monitor weight and BP, and may be discontinued if not losing weight.   Adipex must be used in conjunction with healthy, reduced calorie diet and exercise. Advised weight loss achieved with adipex will not be maintained without permanent behavioral and lifestyle changes. Drink lots of water throughout the day while taking med. Patient voices understanding and feels potential benefit outweighs risk.      Orders:  -     phentermine (ADIPEX-P) 37.5 mg tablet; Take 1 tablet (37.5 mg total) by mouth before breakfast.  Dispense: 30 tablet; Refill: 0    13. Need for diphtheria-tetanus-pertussis (Tdap) vaccine  -     Tdap (BOOSTRIX) vaccine injection 0.5 mL         Plan:   Healthy You screening labs plus other labs needed  for long-term monitoring and workup.  Update Tdap.  MS  report reviewed via Epic with no suspicious activity noted; rx #1 adipex  Declines LDCT.  Declines flu shot.      I have reviewed the medications, allergies, and problem list.     Goal Actions:    What type of visit is the patient here for today?: Healthy You  Does the patient consent to enroll in [facility] Healthy?:  (n/a)  Is this a Wellness Follow Up?: No  What is your overall wellness goal? (select at least one): Lose weight, Improve overall health  Choose 3: Biometric, Lifestyle, Nutrition, Tobacco  Biometric Actions: BMI>30 lose 1-2 lbs per week  Lifestyle Actions : Take medications as prescribed  Nurtrition Actions: Recommend weight loss, Eat heart healthy diet  Tobacco Actions: Patient will not stop using tobacco recommend reducing amt of consumption per day    F/u 1 mth f/u adipex (6/6/24) and HY 1 yr with fasting labs; f/u as needed.    Blessing GOINS

## 2024-04-30 ENCOUNTER — PATIENT OUTREACH (OUTPATIENT)
Dept: ADMINISTRATIVE | Facility: HOSPITAL | Age: 59
End: 2024-04-30

## 2024-04-30 LAB
EST. AVERAGE GLUCOSE BLD GHB EST-MCNC: 137 MG/DL
HBA1C MFR BLD HPLC: 6.4 % (ref 4.5–6.6)

## 2024-04-30 NOTE — PROGRESS NOTES
Population Health Chart Review & Patient Outreach Details      Further Action Needed If Patient Returns Outreach:            Updates Requested / Reviewed:     []  Care Everywhere    []     []  External Sources ([facility], [facility], [facility], etc.)    [] [facility]   [] [facility]   [] Other:    []  Care Team Updated   []  Removed  or Duplicate Orders   []  Immunization Reconciliation Completed / Queried    [] [place]   [] [place]   [] [place]   [] [place]      Health Maintenance Topics Addressed and Outreach Outcomes / Actions Taken:             Breast Cancer Screening []  Mammogram Order Placed    []  Mammogram Screening Scheduled    []  External Records Requested & Care Team Updated if Applicable    []  External Records Uploaded & Care Team Updated if Applicable    []  Pt Declined Scheduling Mammogram    []  Pt Will Schedule with External Provider / Order Routed & Care Team Updated if Applicable              Cervical Cancer Screening []  Pap Smear Scheduled in Primary Care or OBGYN    []  External Records Requested & Care Team Updated if Applicable       []  External Records Uploaded, Care Team Updated, & History Updated if Applicable    []  Patient Declined Scheduling Pap Smear    []  Patient Will Schedule with External Provider & Care Team Updated if Applicable                  Colorectal Cancer Screening []  Colonoscopy Case Request / Referral / Home Test Order Placed    []  External Records Requested & Care Team Updated if Applicable    []  External Records Uploaded, Care Team Updated, & History Updated if Applicable    []  Patient Declined Completing Colon Cancer Screening    []  Patient Will Schedule with External Provider & Care Team Updated if Applicable    []  Fit Kit Mailed (add the SmartPhrase under additional notes)    []  Reminded Patient to Complete Home Test                Diabetic Eye Exam []  Eye Exam Screening Order Placed    []  Eye Camera Scheduled or Optometry/Ophthalmology Referral  Placed    []  External Records Requested & Care Team Updated if Applicable    []  External Records Uploaded, Care Team Updated, & History Updated if Applicable    []  Patient Declined Scheduling Eye Exam    []  Patient Will Schedule with External Provider & Care Team Updated if Applicable             Blood Pressure Control []  Primary Care Follow Up Visit Scheduled     []  Remote Blood Pressure Reading Captured    []  Patient Declined Remote Reading or Scheduling Appt - Escalated to PCP    []  Patient Will Call Back or Send Portal Message with Reading                 HbA1c & Other Labs []  Overdue Lab(s) Ordered    []  Overdue Lab(s) Scheduled    []  External Records Uploaded & Care Team Updated if Applicable    []  Primary Care Follow Up Visit Scheduled     []  Reminded Patient to Complete A1c Home Test    []  Patient Declined Scheduling Labs or Will Call Back to Schedule    []  Patient Will Schedule with External Provider / Order Routed, & Care Team Updated if Applicable           Primary Care Appointment []  Primary Care Appt Scheduled    []  Patient Declined Scheduling or Will Call Back to Schedule    []  Pt Established with External Provider, Updated Care Team, & Informed Pt to Notify Payor if Applicable           Medication Adherence /    Statin Use []  Primary Care Appointment Scheduled    []  Patient Reminded to  Prescription    []  Patient Declined, Provider Notified if Needed    []  Sent Provider Message to Review to Evaluate Pt for Statin, Add Exclusion Dx Codes, Document   Exclusion in Problem List, Change Statin Intensity Level to Moderate or High Intensity if Applicable                Osteoporosis Screening []  Dexa Order Placed    []  Dexa Appointment Scheduled    []  External Records Requested & Care Team Updated    []  External Records Uploaded, Care Team Updated, & History Updated if Applicable    []  Patient Declined Scheduling Dexa or Will Call Back to Schedule    []  Patient Will Schedule  with External Provider / Order Routed & Care Team Updated if Applicable       Additional Notes:.  Post visit Population Health review of encounter with date of service  4/29/24 with Darshana.  All required HY components in encounter.    Followup appt for:5/5/24 Hy

## 2024-04-30 NOTE — ASSESSMENT & PLAN NOTE
Body mass index is 31.84 kg/m². Morbid obesity complicates all aspects of disease management from diagnostic modalities to treatment. Weight loss encouraged and health benefits explained to patient.     His insurance does not cover long-term weight loss meds and he would like to try short-term Adipex again which has been beneficial in the past.    Advised adipex is intended for short term use only with max of 3 mths tx. Discussed potential risks and side effects of adipex - dry mouth, flushing/sweating, increased HR/palpitations, increased BP, or possible heart rhythm disturbance.   Rx requires monthly visit to monitor weight and BP, and may be discontinued if not losing weight.   Adipex must be used in conjunction with healthy, reduced calorie diet and exercise. Advised weight loss achieved with adipex will not be maintained without permanent behavioral and lifestyle changes. Drink lots of water throughout the day while taking med. Patient voices understanding and feels potential benefit outweighs risk.

## 2024-06-06 ENCOUNTER — OFFICE VISIT (OUTPATIENT)
Dept: FAMILY MEDICINE | Facility: CLINIC | Age: 59
End: 2024-06-06
Payer: COMMERCIAL

## 2024-06-06 VITALS
RESPIRATION RATE: 18 BRPM | WEIGHT: 222.38 LBS | OXYGEN SATURATION: 97 % | TEMPERATURE: 98 F | SYSTOLIC BLOOD PRESSURE: 142 MMHG | BODY MASS INDEX: 30.12 KG/M2 | HEIGHT: 72 IN | HEART RATE: 89 BPM | DIASTOLIC BLOOD PRESSURE: 90 MMHG

## 2024-06-06 DIAGNOSIS — E66.9 OBESITY, CLASS I, BMI 30-34.9: Chronic | ICD-10-CM

## 2024-06-06 DIAGNOSIS — I10 ESSENTIAL HYPERTENSION: Primary | Chronic | ICD-10-CM

## 2024-06-06 PROCEDURE — 99214 OFFICE O/P EST MOD 30 MIN: CPT | Mod: ,,, | Performed by: NURSE PRACTITIONER

## 2024-06-06 PROCEDURE — 3077F SYST BP >= 140 MM HG: CPT | Mod: ,,, | Performed by: NURSE PRACTITIONER

## 2024-06-06 PROCEDURE — 4010F ACE/ARB THERAPY RXD/TAKEN: CPT | Mod: ,,, | Performed by: NURSE PRACTITIONER

## 2024-06-06 PROCEDURE — 3066F NEPHROPATHY DOC TX: CPT | Mod: ,,, | Performed by: NURSE PRACTITIONER

## 2024-06-06 PROCEDURE — 3008F BODY MASS INDEX DOCD: CPT | Mod: ,,, | Performed by: NURSE PRACTITIONER

## 2024-06-06 PROCEDURE — 3061F NEG MICROALBUMINURIA REV: CPT | Mod: ,,, | Performed by: NURSE PRACTITIONER

## 2024-06-06 PROCEDURE — 1159F MED LIST DOCD IN RCRD: CPT | Mod: ,,, | Performed by: NURSE PRACTITIONER

## 2024-06-06 PROCEDURE — 3080F DIAST BP >= 90 MM HG: CPT | Mod: ,,, | Performed by: NURSE PRACTITIONER

## 2024-06-06 PROCEDURE — 3044F HG A1C LEVEL LT 7.0%: CPT | Mod: ,,, | Performed by: NURSE PRACTITIONER

## 2024-06-06 PROCEDURE — 1160F RVW MEDS BY RX/DR IN RCRD: CPT | Mod: ,,, | Performed by: NURSE PRACTITIONER

## 2024-06-06 RX ORDER — PHENTERMINE HYDROCHLORIDE 37.5 MG/1
37.5 TABLET ORAL
Qty: 30 TABLET | Refills: 0 | Status: SHIPPED | OUTPATIENT
Start: 2024-06-06 | End: 2024-07-06

## 2024-06-06 NOTE — PROGRESS NOTES
[facility] - FAMILY MEDICINE       PATIENT NAME: Eric Gold   : 1965    AGE: 58 y.o. DATE OF ENCOUNTER: 24    MRN: [id number]      PCP: Blessing Gilliland FNP    Subjective:     Reason for Visit / Chief Complaint:     274}  Chief Complaint   Patient presents with    Weight Management NEW     Patient reports to the clinic for follow up and refill on Adipex.    Health Maintenance     Care gaps addressed, patient declines low dose CT screening and Covid vaccine.    Tami Roberson CMA       HPI:    Presents for one-mth f/u weight management with Adipex in conjunction with therapeutic lifestyle changes.  Is eating smaller portions and making better choices along with increased physical activity and has lost 12 lb.    Allergies and Meds: 274}     Review of patient's allergies indicates:  No Known Allergies     Current Outpatient Medications   Medication Sig Dispense Refill    atorvastatin (LIPITOR) 20 MG tablet Take 1 tablet (20 mg total) by mouth once daily. 90 tablet 3    cyclobenzaprine (FLEXERIL) 10 MG tablet Take 1 tablet (10 mg total) by mouth 3 (three) times daily as needed for Muscle spasms. 90 tablet 1    ibuprofen (ADVIL,MOTRIN) 800 MG tablet Take 1 tablet (800 mg total) by mouth 3 (three) times daily as needed for Pain. Take with food. Avoid all other NSAIDs. 90 tablet 1    lisinopriL-hydrochlorothiazide (PRINZIDE,ZESTORETIC) 20-12.5 mg per tablet Take 1 tablet by mouth once daily. 90 tablet 1    metFORMIN (GLUCOPHAGE-XR) 500 MG ER 24hr tablet Take 1 tablet (500 mg total) by mouth daily with dinner or evening meal. 90 tablet 3    phentermine (ADIPEX-P) 37.5 mg tablet Take 1 tablet (37.5 mg total) by mouth before breakfast. 30 tablet 0     No current facility-administered medications for this visit.       Labs:274}   I have reviewed labs below:    Lab Results   Component Value Date    WBC 9.09 2024    RBC 5.22 2024    HGB 15.2 2024    HCT 46.6 2024      04/29/2024     04/29/2024    K 4.3 04/29/2024     04/29/2024    CALCIUM 9.4 04/29/2024    GLU 84 04/29/2024    BUN 10 04/29/2024    CREATININE 0.88 04/29/2024    EGFRNONAA 77 10/14/2021    ALT 60 04/29/2024    AST 32 04/29/2024    CHOL 178 04/29/2024    TRIG 181 (H) 04/29/2024    HDL 34 (L) 04/29/2024    LDLCALC 108 04/29/2024    TSH 1.340 04/29/2024    PSA 1.520 04/29/2024    HGBA1C 6.4 04/29/2024    MICROALBUR 0.7 04/29/2024     Medical History: 274}     Past Medical History:   Diagnosis Date    Anxiety     Class 1 obesity due to excess calories with body mass index (BMI) of 32.0 to 32.9 in adult 9/8/2021    History of colon polyps 09/30/2020    Dr. Segovia, repeat 5 years    Hyperlipidemia     Hypertension     Kidney stone     Nicotine dependence, cigarettes, uncomplicated       Social History     Tobacco Use   Smoking Status Every Day    Current packs/day: 1.00    Average packs/day: 1 pack/day for 37.0 years (37.0 ttl pk-yrs)    Types: Cigarettes   Smokeless Tobacco Never      Past Surgical History:   Procedure Laterality Date    APPENDECTOMY      COLONOSCOPY      SHOULDER ARTHROSCOPY W/ ROTATOR CUFF REPAIR          Objective:  274}   Vital Signs  Temp: 97.6 °F (36.4 °C)  Temp Source: Oral  Pulse: 89  Resp: 18  SpO2: 97 %  BP: (!) 142/90  BP Location: Left arm  Patient Position: Sitting  Height and Weight  Height: 6' (182.9 cm)  Weight: 100.9 kg (222 lb 6.4 oz)  BSA (Calculated - sq m): 2.26 sq meters  BMI (Calculated): 30.2  Weight in (lb) to have BMI = 25: 183.9    Over the last two weeks how often have you been bothered by little interest or pleasure in doing things: 0  Over the last two weeks how often have you been bothered by feeling down, depressed or hopeless: 0  PHQ-2 Total Score: 0  PHQ-9 Score: 0  PHQ-9 Interpretation: Minimal or None    Wt Readings from Last 3 Encounters:   06/06/24 100.9 kg (222 lb 6.4 oz)   04/29/24 106.5 kg (234 lb 12.8 oz)   03/27/24 105.2 kg (232 lb)      Physical Exam  Vitals and nursing note reviewed.   Constitutional:       General: He is not in acute distress.     Appearance: Normal appearance. He is not ill-appearing.   HENT:      Head: Normocephalic.   Eyes:      Conjunctiva/sclera: Conjunctivae normal.   Cardiovascular:      Rate and Rhythm: Normal rate and regular rhythm.      Heart sounds: Normal heart sounds.   Pulmonary:      Effort: Pulmonary effort is normal. No respiratory distress.      Breath sounds: Normal breath sounds.   Skin:     General: Skin is warm and dry.      Coloration: Skin is not jaundiced or pale.   Neurological:      Mental Status: He is alert and oriented to person, place, and time.      Gait: Gait normal.   Psychiatric:         Mood and Affect: Mood normal.         Behavior: Behavior normal.         Thought Content: Thought content normal.         Judgment: Judgment normal.          Assessment and Plan: 274}     1. Essential hypertension  Assessment & Plan:  Mild elevation today  Continue lifestyle changes with weight loss.  Continue lisinopril hydrochlorothiazide.  Re-evaluate one-mth.      2. Obesity, Class I, BMI 30-34.9  Assessment & Plan:  Wt Readings from Last 3 Encounters:   06/06/24 0923 100.9 kg (222 lb 6.4 oz)   04/29/24 0904 106.5 kg (234 lb 12.8 oz)   03/27/24 0842 105.2 kg (232 lb)     Has lost 12 lbs in his 1st mth on Adipex in conjunction with therapeutic lifestyle changes.  MS  report reviewed via Epic with no suspicious activity noted.  Advised he will not be eligible for a 3rd Adipex Rx until September but I want him to f/u in one-mth to see how much more weight he is able to lose and to re-evaluate his blood pressure.    Orders:  -     phentermine (ADIPEX-P) 37.5 mg tablet; Take 1 tablet (37.5 mg total) by mouth before breakfast.  Dispense: 30 tablet; Refill: 0      Diagnosis, risks, benefits, and side effects of any meds and treatment plan were discussed with the patient.  All questions were answered to the  satisfaction of the patient, and pt verbalized understanding and agreement to treatment plan.      Follow up in about 1 month (around 7/6/2024) for hypertension, Adipex, weight management.    Signature:  BUSTER Pratt-BC    Future Appointments   Date Time Provider Department Center   5/5/2025  7:40 AM Blessing Gilliland FNP [facility]

## 2024-06-06 NOTE — ASSESSMENT & PLAN NOTE
Wt Readings from Last 3 Encounters:   06/06/24 0923 100.9 kg (222 lb 6.4 oz)   04/29/24 0904 106.5 kg (234 lb 12.8 oz)   03/27/24 0842 105.2 kg (232 lb)     Has lost 12 lbs in his 1st mth on Adipex in conjunction with therapeutic lifestyle changes.  MS  report reviewed via Epic with no suspicious activity noted.  Advised he will not be eligible for a 3rd Adipex Rx until September but I want him to f/u in one-mth to see how much more weight he is able to lose and to re-evaluate his blood pressure.

## 2024-06-06 NOTE — ASSESSMENT & PLAN NOTE
Mild elevation today  Continue lifestyle changes with weight loss.  Continue lisinopril hydrochlorothiazide.  Re-evaluate one-mth.

## 2024-07-08 ENCOUNTER — OFFICE VISIT (OUTPATIENT)
Dept: FAMILY MEDICINE | Facility: CLINIC | Age: 59
End: 2024-07-08
Payer: COMMERCIAL

## 2024-07-08 VITALS
DIASTOLIC BLOOD PRESSURE: 90 MMHG | TEMPERATURE: 98 F | BODY MASS INDEX: 29.71 KG/M2 | HEART RATE: 110 BPM | RESPIRATION RATE: 18 BRPM | OXYGEN SATURATION: 98 % | SYSTOLIC BLOOD PRESSURE: 138 MMHG | WEIGHT: 219.38 LBS | HEIGHT: 72 IN

## 2024-07-08 DIAGNOSIS — E11.9 TYPE 2 DIABETES MELLITUS WITHOUT COMPLICATION, WITHOUT LONG-TERM CURRENT USE OF INSULIN: Primary | ICD-10-CM

## 2024-07-08 DIAGNOSIS — I10 ESSENTIAL HYPERTENSION: Chronic | ICD-10-CM

## 2024-07-08 PROCEDURE — 99214 OFFICE O/P EST MOD 30 MIN: CPT | Mod: ,,, | Performed by: NURSE PRACTITIONER

## 2024-07-08 PROCEDURE — 1160F RVW MEDS BY RX/DR IN RCRD: CPT | Mod: ,,, | Performed by: NURSE PRACTITIONER

## 2024-07-08 PROCEDURE — 3044F HG A1C LEVEL LT 7.0%: CPT | Mod: ,,, | Performed by: NURSE PRACTITIONER

## 2024-07-08 PROCEDURE — 3075F SYST BP GE 130 - 139MM HG: CPT | Mod: ,,, | Performed by: NURSE PRACTITIONER

## 2024-07-08 PROCEDURE — 1159F MED LIST DOCD IN RCRD: CPT | Mod: ,,, | Performed by: NURSE PRACTITIONER

## 2024-07-08 PROCEDURE — 3080F DIAST BP >= 90 MM HG: CPT | Mod: ,,, | Performed by: NURSE PRACTITIONER

## 2024-07-08 PROCEDURE — 4010F ACE/ARB THERAPY RXD/TAKEN: CPT | Mod: ,,, | Performed by: NURSE PRACTITIONER

## 2024-07-08 PROCEDURE — 3066F NEPHROPATHY DOC TX: CPT | Mod: ,,, | Performed by: NURSE PRACTITIONER

## 2024-07-08 PROCEDURE — 3008F BODY MASS INDEX DOCD: CPT | Mod: ,,, | Performed by: NURSE PRACTITIONER

## 2024-07-08 PROCEDURE — 3061F NEG MICROALBUMINURIA REV: CPT | Mod: ,,, | Performed by: NURSE PRACTITIONER

## 2024-07-08 RX ORDER — SEMAGLUTIDE 0.68 MG/ML
0.5 INJECTION, SOLUTION SUBCUTANEOUS
Qty: 9 ML | Refills: 0 | Status: SHIPPED | OUTPATIENT
Start: 2024-07-08 | End: 2024-10-06

## 2024-07-08 NOTE — PROGRESS NOTES
[facility] - FAMILY MEDICINE       PATIENT NAME: Eric Gold   : 1965    AGE: 59 y.o. DATE OF ENCOUNTER: 24    MRN: [id number]      PCP: Blessing Gilliland FNP    Subjective:     Reason for Visit / Chief Complaint:     274}  Chief Complaint   Patient presents with    Hypertension     Patient reports to the clinic for follow up on blood pressure.    Weight Management NEW     Patient reports to the clinic for follow up and refill of Adipex.    Health Maintenance     Care gaps addressed, patient declines all vaccines and screenings at this time.    Tami Roberson CMA     Presents for one-mth f/u uncontrolled HTN and one-mth f/u Adipex.  Also to discuss T2DM treatment.  BP uncontrolled today, drank a Red Bull prior to appt.  Per  treated with adipex 23, 24, & 24.  Has lost 3 more lbs since last visit and is down 15 lbs since April.    Review of Systems:     Review of Systems   Constitutional: Negative.    HENT: Negative.     Eyes: Negative.    Respiratory: Negative.  Negative for shortness of breath.    Cardiovascular: Negative.  Negative for chest pain and palpitations.   Gastrointestinal: Negative.    Genitourinary: Negative.    Musculoskeletal:  Positive for arthralgias.   Skin: Negative.    Neurological: Negative.  Negative for dizziness and headaches.   Hematological: Negative.    Psychiatric/Behavioral: Negative.         Allergies and Meds: 274}     Review of patient's allergies indicates:  No Known Allergies     Current Outpatient Medications   Medication Sig Dispense Refill    atorvastatin (LIPITOR) 20 MG tablet Take 1 tablet (20 mg total) by mouth once daily. 90 tablet 3    cyclobenzaprine (FLEXERIL) 10 MG tablet Take 1 tablet (10 mg total) by mouth 3 (three) times daily as needed for Muscle spasms. 90 tablet 1    ibuprofen (ADVIL,MOTRIN) 800 MG tablet Take 1 tablet (800 mg total) by mouth 3 (three) times daily as needed for Pain. Take with food. Avoid all  other NSAIDs. 90 tablet 1    lisinopriL-hydrochlorothiazide (PRINZIDE,ZESTORETIC) 20-12.5 mg per tablet Take 1 tablet by mouth once daily. 90 tablet 1    metFORMIN (GLUCOPHAGE-XR) 500 MG ER 24hr tablet Take 1 tablet (500 mg total) by mouth daily with dinner or evening meal. 90 tablet 3    semaglutide (OZEMPIC) 0.25 mg or 0.5 mg (2 mg/3 mL) pen injector Inject 0.5 mg into the skin every 7 days. Start with 0.25 mg weekly x 4 weeks then 0.5 mg weekly. 9 mL 0     No current facility-administered medications for this visit.       Labs:274}   I have reviewed labs below:    Lab Results   Component Value Date    WBC 9.09 04/29/2024    RBC 5.22 04/29/2024    HGB 15.2 04/29/2024    HCT 46.6 04/29/2024     04/29/2024     04/29/2024    K 4.3 04/29/2024     04/29/2024    CALCIUM 9.4 04/29/2024    GLU 84 04/29/2024    BUN 10 04/29/2024    CREATININE 0.88 04/29/2024    EGFRNONAA 77 10/14/2021    ALT 60 04/29/2024    AST 32 04/29/2024    CHOL 178 04/29/2024    TRIG 181 (H) 04/29/2024    HDL 34 (L) 04/29/2024    LDLCALC 108 04/29/2024    TSH 1.340 04/29/2024    PSA 1.520 04/29/2024    HGBA1C 6.4 04/29/2024    MICROALBUR 0.7 04/29/2024     Medical History: 274}     Past Medical History:   Diagnosis Date    Anxiety     Class 1 obesity due to excess calories with body mass index (BMI) of 32.0 to 32.9 in adult 9/8/2021    History of colon polyps 09/30/2020    Dr. Segovia, repeat 5 years    Hyperlipidemia     Hypertension     Kidney stone     Nicotine dependence, cigarettes, uncomplicated       Social History     Tobacco Use   Smoking Status Every Day    Current packs/day: 1.00    Average packs/day: 1 pack/day for 37.0 years (37.0 ttl pk-yrs)    Types: Cigarettes   Smokeless Tobacco Never      Health Maintenance:      Health Maintenance Topics with due status: Not Due       Topic Last Completion Date    Colorectal Cancer Screening 09/30/2020    PROSTATE-SPECIFIC ANTIGEN 04/29/2024    TETANUS VACCINE 04/29/2024     Influenza Vaccine 04/29/2024    Hemoglobin A1c (Prediabetes) 04/29/2024    Lipid Panel 04/29/2024       Objective:  274}   Vital Signs  Temp: 98.4 °F (36.9 °C)  Temp Source: Oral  Pulse: 110  Resp: 18  SpO2: 98 %  BP: (!) 138/90  BP Location: Left arm  Patient Position: Sitting  Pain Score: 0-No pain  Height and Weight  Height: 6' (182.9 cm)  Weight: 99.5 kg (219 lb 6.4 oz)  BSA (Calculated - sq m): 2.25 sq meters  BMI (Calculated): 29.7  Weight in (lb) to have BMI = 25: 183.9    Over the last two weeks how often have you been bothered by little interest or pleasure in doing things: 0  Over the last two weeks how often have you been bothered by feeling down, depressed or hopeless: 0  PHQ-2 Total Score: 0  PHQ-9 Score: 0  PHQ-9 Interpretation: Minimal or None    Wt Readings from Last 3 Encounters:   07/08/24 99.5 kg (219 lb 6.4 oz)   06/06/24 100.9 kg (222 lb 6.4 oz)   04/29/24 106.5 kg (234 lb 12.8 oz)     Physical Exam  Vitals and nursing note reviewed.   Constitutional:       General: He is not in acute distress.     Appearance: Normal appearance. He is not ill-appearing.   HENT:      Head: Normocephalic.   Eyes:      Conjunctiva/sclera: Conjunctivae normal.   Cardiovascular:      Rate and Rhythm: Normal rate and regular rhythm.      Heart sounds: Normal heart sounds.   Pulmonary:      Effort: Pulmonary effort is normal. No respiratory distress.      Breath sounds: Normal breath sounds. No wheezing, rhonchi or rales.   Musculoskeletal:      Right lower leg: No edema.      Left lower leg: No edema.   Skin:     General: Skin is warm and dry.      Coloration: Skin is not jaundiced or pale.   Neurological:      Mental Status: He is alert and oriented to person, place, and time.      Gait: Gait normal.   Psychiatric:         Mood and Affect: Mood normal.         Behavior: Behavior normal.         Thought Content: Thought content normal.         Judgment: Judgment normal.          Assessment and Plan: 274}     1. Type 2  diabetes mellitus without complication, without long-term current use of insulin  Overview:  Lab Results   Component Value Date    HGBA1C 5.9 10/27/2022         Assessment & Plan:   Latest Reference Range & Units 10/27/22 08:35 09/27/23 13:54 04/29/24 09:11   Hemoglobin A1C External 4.5 - 6.6 % 5.9 6.0 6.4   Estimated Avg Glucose mg/dL 110 114 137     Rx metformin started 10/16/23, but A1c has continued to rise with last A1c 6.4% on metformin so recommend adding GLP-1 for T2DM and is agreeable.  Discussed that had he not been on metformin A1c could have been 7% or higher and T2DM will continue to progress without additional intervention.  Continue metformin.  Start Ozempic/semaglutide preferably due to the new indication to reduce the risk of major adverse cardiovascular event given his high-risk status having T2DM, cigarette smoker, uncontrolled HTN, uncontrolled HLD.  We will consider other GLP1 if semaglutide not covered such as Mounjaro or Trulicity.    Orders:  -     semaglutide (OZEMPIC) 0.25 mg or 0.5 mg (2 mg/3 mL) pen injector; Inject 0.5 mg into the skin every 7 days. Start with 0.25 mg weekly x 4 weeks then 0.5 mg weekly.  Dispense: 9 mL; Refill: 0    2. Essential hypertension  Assessment & Plan:  BP elevated today at 170/116 initially but came down on subsequent checks  He does not want to increase lisinopril hydrochlorothiazide 20/12.5 mg daily, but will keep a close watch on BP and let me know if he is not meeting goal of < 130/80 so adjustment can be made.    In the meantime he will continue lifestyle changes in efforts to lose weight.        Diagnosis, risks, benefits, and side effects of any meds and treatment plan were discussed with the patient.  Patient to call or follow-up with any new or worsening symptoms or problems prior to next appointment.  Go to ER for any urgent complications.  All questions were answered to the satisfaction of the patient, and pt verbalized understanding and agreement  to treatment plan.      Follow up in about 3 months (around 10/8/2024) for T2DM, with nonfasting lab, hypertension.    Signature:  BUSTER Pratt-BC    Future Appointments   Date Time Provider Department Center   10/9/2024 10:40 AM Blessing Gilliland FNP [facility]   5/5/2025  7:40 AM Blessing Gilliland FNP [facility]

## 2024-07-08 NOTE — ASSESSMENT & PLAN NOTE
Latest Reference Range & Units 10/27/22 08:35 09/27/23 13:54 04/29/24 09:11   Hemoglobin A1C External 4.5 - 6.6 % 5.9 6.0 6.4   Estimated Avg Glucose mg/dL 110 114 137     Rx metformin started 10/16/23, but A1c has continued to rise with last A1c 6.4% on metformin so recommend adding GLP-1 for T2DM and is agreeable.  Discussed that had he not been on metformin A1c could have been 7% or higher and T2DM will continue to progress without additional intervention.  Continue metformin.  Start Ozempic/semaglutide preferably due to the new indication to reduce the risk of major adverse cardiovascular event given his high-risk status having T2DM, cigarette smoker, uncontrolled HTN, uncontrolled HLD.  We will consider other GLP1 if semaglutide not covered such as Mounjaro or Trulicity.

## 2024-07-08 NOTE — ASSESSMENT & PLAN NOTE
BP elevated today at 170/116 initially but came down on subsequent checks  He does not want to increase lisinopril hydrochlorothiazide 20/12.5 mg daily, but will keep a close watch on BP and let me know if he is not meeting goal of < 130/80 so adjustment can be made.    In the meantime he will continue lifestyle changes in efforts to lose weight.

## 2024-07-10 ENCOUNTER — TELEPHONE (OUTPATIENT)
Dept: FAMILY MEDICINE | Facility: CLINIC | Age: 59
End: 2024-07-10
Payer: COMMERCIAL

## 2024-10-20 ENCOUNTER — OFFICE VISIT (OUTPATIENT)
Dept: FAMILY MEDICINE | Facility: CLINIC | Age: 59
End: 2024-10-20
Payer: COMMERCIAL

## 2024-10-20 VITALS
WEIGHT: 230 LBS | HEIGHT: 73 IN | BODY MASS INDEX: 30.48 KG/M2 | TEMPERATURE: 98 F | RESPIRATION RATE: 20 BRPM | SYSTOLIC BLOOD PRESSURE: 150 MMHG | OXYGEN SATURATION: 97 % | HEART RATE: 96 BPM | DIASTOLIC BLOOD PRESSURE: 102 MMHG

## 2024-10-20 DIAGNOSIS — R05.1 ACUTE COUGH: ICD-10-CM

## 2024-10-20 DIAGNOSIS — Z20.822 CONTACT WITH AND (SUSPECTED) EXPOSURE TO COVID-19: ICD-10-CM

## 2024-10-20 DIAGNOSIS — J01.90 ACUTE NON-RECURRENT SINUSITIS, UNSPECIFIED LOCATION: Primary | ICD-10-CM

## 2024-10-20 LAB
CTP QC/QA: YES
MOLECULAR STREP A: NEGATIVE
POC MOLECULAR INFLUENZA A AGN: NEGATIVE
POC MOLECULAR INFLUENZA B AGN: NEGATIVE
SARS-COV-2 RDRP RESP QL NAA+PROBE: NEGATIVE

## 2024-10-20 PROCEDURE — 87502 INFLUENZA DNA AMP PROBE: CPT | Mod: QW,,, | Performed by: NURSE PRACTITIONER

## 2024-10-20 PROCEDURE — 3008F BODY MASS INDEX DOCD: CPT | Mod: ,,, | Performed by: NURSE PRACTITIONER

## 2024-10-20 PROCEDURE — 99051 MED SERV EVE/WKEND/HOLIDAY: CPT | Mod: ,,, | Performed by: NURSE PRACTITIONER

## 2024-10-20 PROCEDURE — 3080F DIAST BP >= 90 MM HG: CPT | Mod: ,,, | Performed by: NURSE PRACTITIONER

## 2024-10-20 PROCEDURE — 3077F SYST BP >= 140 MM HG: CPT | Mod: ,,, | Performed by: NURSE PRACTITIONER

## 2024-10-20 PROCEDURE — 96372 THER/PROPH/DIAG INJ SC/IM: CPT | Mod: ,,, | Performed by: NURSE PRACTITIONER

## 2024-10-20 PROCEDURE — 99214 OFFICE O/P EST MOD 30 MIN: CPT | Mod: 25,,, | Performed by: NURSE PRACTITIONER

## 2024-10-20 PROCEDURE — 4010F ACE/ARB THERAPY RXD/TAKEN: CPT | Mod: ,,, | Performed by: NURSE PRACTITIONER

## 2024-10-20 PROCEDURE — 1159F MED LIST DOCD IN RCRD: CPT | Mod: ,,, | Performed by: NURSE PRACTITIONER

## 2024-10-20 PROCEDURE — 3066F NEPHROPATHY DOC TX: CPT | Mod: ,,, | Performed by: NURSE PRACTITIONER

## 2024-10-20 PROCEDURE — 87635 SARS-COV-2 COVID-19 AMP PRB: CPT | Mod: QW,,, | Performed by: NURSE PRACTITIONER

## 2024-10-20 PROCEDURE — 3044F HG A1C LEVEL LT 7.0%: CPT | Mod: ,,, | Performed by: NURSE PRACTITIONER

## 2024-10-20 PROCEDURE — 3061F NEG MICROALBUMINURIA REV: CPT | Mod: ,,, | Performed by: NURSE PRACTITIONER

## 2024-10-20 PROCEDURE — 87651 STREP A DNA AMP PROBE: CPT | Mod: QW,,, | Performed by: NURSE PRACTITIONER

## 2024-10-20 RX ORDER — GUAIFENESIN AND PHENYLEPHRINE HCL 385; 10 MG/1; MG/1
1 TABLET ORAL 4 TIMES DAILY PRN
Qty: 20 TABLET | Refills: 0 | Status: SHIPPED | OUTPATIENT
Start: 2024-10-20

## 2024-10-20 RX ORDER — CEFTRIAXONE 1 G/1
1 INJECTION, POWDER, FOR SOLUTION INTRAMUSCULAR; INTRAVENOUS
Status: COMPLETED | OUTPATIENT
Start: 2024-10-20 | End: 2024-10-20

## 2024-10-20 RX ORDER — DEXAMETHASONE SODIUM PHOSPHATE 4 MG/ML
6 INJECTION, SOLUTION INTRA-ARTICULAR; INTRALESIONAL; INTRAMUSCULAR; INTRAVENOUS; SOFT TISSUE ONCE
Status: COMPLETED | OUTPATIENT
Start: 2024-10-20 | End: 2024-10-20

## 2024-10-20 RX ORDER — AZITHROMYCIN 500 MG/1
500 TABLET, FILM COATED ORAL DAILY
Qty: 5 TABLET | Refills: 0 | Status: SHIPPED | OUTPATIENT
Start: 2024-10-20 | End: 2024-10-25

## 2024-10-20 RX ADMIN — CEFTRIAXONE 1 G: 1 INJECTION, POWDER, FOR SOLUTION INTRAMUSCULAR; INTRAVENOUS at 03:10

## 2024-10-20 RX ADMIN — DEXAMETHASONE SODIUM PHOSPHATE 6 MG: 4 INJECTION, SOLUTION INTRA-ARTICULAR; INTRALESIONAL; INTRAMUSCULAR; INTRAVENOUS; SOFT TISSUE at 04:10

## 2024-10-20 NOTE — PROGRESS NOTES
Subjective:       Patient ID: Eric Gold is a 59 y.o. male.    Chief Complaint: Sinusitis (ONSET:  LAST NIGHT, FEELING OF CONGESTION AND STUFFINESS.  WIFE JUST GETTING OVER S/S OF A COLD.  JUST DOSENT FEEL WELL.  ), Sore Throat, Nasal Congestion, and Generalized Body Aches    Sinusitis, Sore Throat, Nasal Congestion, and Generalized Body Aches      Sinusitis  Associated symptoms include congestion, sinus pressure and a sore throat. Pertinent negatives include no coughing, ear pain, headaches, neck pain or shortness of breath.   Sore Throat   Associated symptoms include congestion. Pertinent negatives include no abdominal pain, coughing, ear pain, headaches, neck pain, shortness of breath or vomiting.     Review of Systems   Constitutional:  Negative for appetite change, fatigue and fever.   HENT:  Positive for nasal congestion, sinus pressure/congestion and sore throat. Negative for ear pain.    Eyes:  Negative for pain, discharge and itching.   Respiratory:  Negative for cough and shortness of breath.    Cardiovascular:  Negative for chest pain and leg swelling.   Gastrointestinal:  Negative for abdominal pain, change in bowel habit, nausea and vomiting.   Musculoskeletal:  Positive for myalgias. Negative for back pain, gait problem and neck pain.   Integumentary:  Negative for rash and wound.   Neurological:  Negative for dizziness, weakness and headaches.   All other systems reviewed and are negative.        Objective:      Physical Exam  Vitals and nursing note reviewed.   Constitutional:       General: He is not in acute distress.     Appearance: Normal appearance. He is not ill-appearing, toxic-appearing or diaphoretic.   HENT:      Head: Normocephalic.      Right Ear: Tympanic membrane, ear canal and external ear normal.      Left Ear: Tympanic membrane, ear canal and external ear normal.      Nose: Mucosal edema and congestion present. No rhinorrhea.      Right Turbinates: Swollen.      Left  Turbinates: Swollen.      Right Sinus: Maxillary sinus tenderness present.      Left Sinus: Maxillary sinus tenderness present.      Mouth/Throat:      Mouth: Mucous membranes are moist.      Pharynx: Posterior oropharyngeal erythema present. No oropharyngeal exudate.   Eyes:      General: No scleral icterus.        Right eye: No discharge.         Left eye: No discharge.      Extraocular Movements: Extraocular movements intact.      Conjunctiva/sclera: Conjunctivae normal.      Pupils: Pupils are equal, round, and reactive to light.   Cardiovascular:      Rate and Rhythm: Normal rate and regular rhythm.      Pulses: Normal pulses.      Heart sounds: Normal heart sounds. No murmur heard.  Pulmonary:      Effort: Pulmonary effort is normal. No respiratory distress.      Breath sounds: No wheezing, rhonchi or rales.      Comments: Coarse breath sounds  Musculoskeletal:         General: Normal range of motion.      Cervical back: Neck supple. No tenderness.   Lymphadenopathy:      Cervical: No cervical adenopathy.   Skin:     General: Skin is warm and dry.      Capillary Refill: Capillary refill takes less than 2 seconds.      Findings: No rash.   Neurological:      Mental Status: He is alert and oriented to person, place, and time.   Psychiatric:         Mood and Affect: Mood normal.         Behavior: Behavior normal.         Thought Content: Thought content normal.         Judgment: Judgment normal.            Assessment:       1. Acute non-recurrent sinusitis, unspecified location    2. Contact with and (suspected) exposure to covid-19    3. Acute cough        Plan:   Acute non-recurrent sinusitis, unspecified location  -     dexAMETHasone injection 6 mg  -     cefTRIAXone injection 1 g  -     azithromycin (ZITHROMAX) 500 MG tablet; Take 1 tablet (500 mg total) by mouth once daily. for 5 days  Dispense: 5 tablet; Refill: 0  -     phenylephrine-guaiFENesin (DECONEX IR)  mg Tab; Take 1 tablet by mouth 4 (four)  times daily as needed (congestion).  Dispense: 20 tablet; Refill: 0    Contact with and (suspected) exposure to covid-19  -     POCT COVID-19 Rapid Screening  -     POCT Influenza A/B Molecular  -     POCT Strep A, Molecular    Acute cough  -     phenylephrine-guaiFENesin (DECONEX IR)  mg Tab; Take 1 tablet by mouth 4 (four) times daily as needed (congestion).  Dispense: 20 tablet; Refill: 0           Risks, benefits, and side effects were discussed with the patient. All questions were answered to the fullest satisfaction of the patient, and pt verbalized understanding and agreement to treatment plan. Pt was to call with any new or worsening symptoms, or present to the ER

## 2024-11-13 ENCOUNTER — OFFICE VISIT (OUTPATIENT)
Dept: FAMILY MEDICINE | Facility: CLINIC | Age: 59
End: 2024-11-13
Payer: COMMERCIAL

## 2024-11-13 VITALS
TEMPERATURE: 99 F | WEIGHT: 237 LBS | HEART RATE: 85 BPM | BODY MASS INDEX: 31.41 KG/M2 | DIASTOLIC BLOOD PRESSURE: 98 MMHG | RESPIRATION RATE: 18 BRPM | SYSTOLIC BLOOD PRESSURE: 158 MMHG | HEIGHT: 73 IN | OXYGEN SATURATION: 98 %

## 2024-11-13 DIAGNOSIS — G89.29 CHRONIC BILATERAL LOW BACK PAIN WITHOUT SCIATICA: Chronic | ICD-10-CM

## 2024-11-13 DIAGNOSIS — M54.50 CHRONIC BILATERAL LOW BACK PAIN WITHOUT SCIATICA: Chronic | ICD-10-CM

## 2024-11-13 DIAGNOSIS — Z23 FLU VACCINE NEED: ICD-10-CM

## 2024-11-13 DIAGNOSIS — E11.9 TYPE 2 DIABETES MELLITUS WITHOUT COMPLICATION, WITHOUT LONG-TERM CURRENT USE OF INSULIN: Chronic | ICD-10-CM

## 2024-11-13 DIAGNOSIS — I10 ESSENTIAL HYPERTENSION: Primary | Chronic | ICD-10-CM

## 2024-11-13 PROBLEM — R05.1 ACUTE COUGH: Status: RESOLVED | Noted: 2024-10-20 | Resolved: 2024-11-13

## 2024-11-13 PROBLEM — Z20.822 CONTACT WITH AND (SUSPECTED) EXPOSURE TO COVID-19: Status: RESOLVED | Noted: 2024-10-20 | Resolved: 2024-11-13

## 2024-11-13 PROBLEM — J01.90 ACUTE NON-RECURRENT SINUSITIS: Status: RESOLVED | Noted: 2021-12-10 | Resolved: 2024-11-13

## 2024-11-13 PROCEDURE — 3066F NEPHROPATHY DOC TX: CPT | Mod: ,,, | Performed by: NURSE PRACTITIONER

## 2024-11-13 PROCEDURE — 90471 IMMUNIZATION ADMIN: CPT | Mod: ,,, | Performed by: NURSE PRACTITIONER

## 2024-11-13 PROCEDURE — 90656 IIV3 VACC NO PRSV 0.5 ML IM: CPT | Mod: ,,, | Performed by: NURSE PRACTITIONER

## 2024-11-13 PROCEDURE — 1159F MED LIST DOCD IN RCRD: CPT | Mod: ,,, | Performed by: NURSE PRACTITIONER

## 2024-11-13 PROCEDURE — 3044F HG A1C LEVEL LT 7.0%: CPT | Mod: ,,, | Performed by: NURSE PRACTITIONER

## 2024-11-13 PROCEDURE — 3061F NEG MICROALBUMINURIA REV: CPT | Mod: ,,, | Performed by: NURSE PRACTITIONER

## 2024-11-13 PROCEDURE — 3008F BODY MASS INDEX DOCD: CPT | Mod: ,,, | Performed by: NURSE PRACTITIONER

## 2024-11-13 PROCEDURE — 3080F DIAST BP >= 90 MM HG: CPT | Mod: ,,, | Performed by: NURSE PRACTITIONER

## 2024-11-13 PROCEDURE — 4010F ACE/ARB THERAPY RXD/TAKEN: CPT | Mod: ,,, | Performed by: NURSE PRACTITIONER

## 2024-11-13 PROCEDURE — 99214 OFFICE O/P EST MOD 30 MIN: CPT | Mod: 25,,, | Performed by: NURSE PRACTITIONER

## 2024-11-13 PROCEDURE — 3077F SYST BP >= 140 MM HG: CPT | Mod: ,,, | Performed by: NURSE PRACTITIONER

## 2024-11-13 PROCEDURE — 1160F RVW MEDS BY RX/DR IN RCRD: CPT | Mod: ,,, | Performed by: NURSE PRACTITIONER

## 2024-11-13 RX ORDER — LISINOPRIL AND HYDROCHLOROTHIAZIDE 12.5; 2 MG/1; MG/1
1 TABLET ORAL DAILY
Qty: 90 TABLET | Refills: 1 | Status: SHIPPED | OUTPATIENT
Start: 2024-11-13 | End: 2024-11-13

## 2024-11-13 RX ORDER — IBUPROFEN 800 MG/1
800 TABLET ORAL 3 TIMES DAILY PRN
Qty: 90 TABLET | Refills: 1 | Status: SHIPPED | OUTPATIENT
Start: 2024-11-13

## 2024-11-13 RX ORDER — LISINOPRIL AND HYDROCHLOROTHIAZIDE 12.5; 2 MG/1; MG/1
2 TABLET ORAL DAILY
Qty: 180 TABLET | Refills: 1 | Status: SHIPPED | OUTPATIENT
Start: 2024-11-13

## 2024-11-13 RX ORDER — CYCLOBENZAPRINE HCL 10 MG
10 TABLET ORAL 3 TIMES DAILY PRN
Qty: 90 TABLET | Refills: 2 | Status: SHIPPED | OUTPATIENT
Start: 2024-11-13

## 2024-11-13 NOTE — PROGRESS NOTES
[facility]  [address] [place] [address]  Phone: [phone]  Fax: [phone]       PATIENT NAME: Eric Gold   : 1965    AGE: 59 y.o. DATE OF ENCOUNTER: 24    MRN: [id number]      PCP: Blessing Gilliland FNP    Subjective:     Reason for Visit / Chief Complaint:     274}  Chief Complaint   Patient presents with    Diabetes     Patient reports to the clinic for 3 month follow up.    Health Maintenance     Care gaps addressed, patient would like his Flu vaccine, declines LDCT and all other vaccines today.    Tami Roberson, CMA     F/u T2DM, HTN, and needs med refills.  Does not want to have labs today.  Wants to wait until January to update labs  Leaves 2nd wk of Dec and will be gone a while for work.      Review of Systems:     Review of Systems   Constitutional: Negative.    HENT: Negative.     Eyes: Negative.    Respiratory: Negative.  Negative for shortness of breath.    Cardiovascular: Negative.  Negative for chest pain and palpitations.   Gastrointestinal: Negative.    Genitourinary: Negative.    Musculoskeletal:  Positive for arthralgias.   Skin: Negative.    Neurological: Negative.  Negative for dizziness and headaches.   Hematological: Negative.    Psychiatric/Behavioral: Negative.         Allergies and Meds: 274}     Review of patient's allergies indicates:  No Known Allergies     Current Outpatient Medications   Medication Sig Dispense Refill    atorvastatin (LIPITOR) 20 MG tablet Take 1 tablet (20 mg total) by mouth once daily. 90 tablet 3    cyclobenzaprine (FLEXERIL) 10 MG tablet Take 1 tablet (10 mg total) by mouth 3 (three) times daily as needed for Muscle spasms. 90 tablet 2    ibuprofen (ADVIL,MOTRIN) 800 MG tablet Take 1 tablet (800 mg total) by mouth 3 (three) times daily as needed for Pain. Take with food. Avoid all other NSAIDs. 90 tablet 1    lisinopriL-hydrochlorothiazide (PRINZIDE,ZESTORETIC) 20-12.5 mg per tablet Take 2  tablets by mouth once daily. 180 tablet 1     No current facility-administered medications for this visit.       Labs:274}   I have reviewed labs below:    Lab Results   Component Value Date    WBC 9.09 04/29/2024    RBC 5.22 04/29/2024    HGB 15.2 04/29/2024    HCT 46.6 04/29/2024     04/29/2024     04/29/2024    K 4.3 04/29/2024     04/29/2024    CALCIUM 9.4 04/29/2024    GLU 84 04/29/2024    BUN 10 04/29/2024    CREATININE 0.88 04/29/2024    EGFRNONAA 77 10/14/2021    ALT 60 04/29/2024    AST 32 04/29/2024    CHOL 178 04/29/2024    TRIG 181 (H) 04/29/2024    HDL 34 (L) 04/29/2024    LDLCALC 108 04/29/2024    TSH 1.340 04/29/2024    PSA 1.520 04/29/2024    HGBA1C 6.4 04/29/2024    MICROALBUR 0.7 04/29/2024     Medical History: 274}     Past Medical History:   Diagnosis Date    Anxiety     Class 1 obesity due to excess calories with body mass index (BMI) of 32.0 to 32.9 in adult 9/8/2021    History of colon polyps 09/30/2020    Dr. Segovia, repeat 5 years    Hyperlipidemia     Hypertension     Kidney stone     Nicotine dependence, cigarettes, uncomplicated       Social History     Tobacco Use   Smoking Status Every Day    Current packs/day: 1.00    Average packs/day: 1 pack/day for 37.0 years (37.0 ttl pk-yrs)    Types: Cigarettes   Smokeless Tobacco Never      Past Surgical History:   Procedure Laterality Date    APPENDECTOMY      COLONOSCOPY      SHOULDER ARTHROSCOPY W/ ROTATOR CUFF REPAIR          Health Maintenance:      Health Maintenance Topics with due status: Not Due       Topic Last Completion Date    Colorectal Cancer Screening 09/30/2020    PROSTATE-SPECIFIC ANTIGEN 04/29/2024    TETANUS VACCINE 04/29/2024    Diabetes Urine Screening 04/29/2024    Lipid Panel 04/29/2024    Low Dose Statin 11/13/2024       Objective:  274}   Vital Signs  Temp: 98.5 °F (36.9 °C)  Temp Source: Oral  Pulse: 85  Resp: 18  SpO2: 98 %  BP: (!) 158/98  BP Location: Right arm  Patient Position: Sitting  Pain  "Score: 0-No pain  Height and Weight  Height: 6' 1" (185.4 cm)  Weight: 107.5 kg (237 lb)  BSA (Calculated - sq m): 2.35 sq meters  BMI (Calculated): 31.3  Weight in (lb) to have BMI = 25: 189.1    Over the last two weeks how often have you been bothered by little interest or pleasure in doing things: 0  Over the last two weeks how often have you been bothered by feeling down, depressed or hopeless: 0  PHQ-2 Total Score: 0    Wt Readings from Last 3 Encounters:   11/13/24 107.5 kg (237 lb)   10/20/24 104.3 kg (230 lb)   07/08/24 99.5 kg (219 lb 6.4 oz)     Physical Exam  Vitals and nursing note reviewed.   Constitutional:       General: He is not in acute distress.     Appearance: Normal appearance. He is not ill-appearing.   HENT:      Head: Normocephalic.   Eyes:      Conjunctiva/sclera: Conjunctivae normal.   Neck:      Trachea: Trachea normal.   Cardiovascular:      Rate and Rhythm: Normal rate and regular rhythm.      Pulses: Normal pulses.      Heart sounds: Normal heart sounds.   Pulmonary:      Effort: Pulmonary effort is normal. No respiratory distress.      Breath sounds: Normal breath sounds. No wheezing, rhonchi or rales.   Musculoskeletal:      Cervical back: Neck supple.      Right lower leg: No edema.      Left lower leg: No edema.   Lymphadenopathy:      Cervical: No cervical adenopathy.   Skin:     General: Skin is warm and dry.      Coloration: Skin is not jaundiced or pale.   Neurological:      Mental Status: He is alert and oriented to person, place, and time.      Gait: Gait normal.   Psychiatric:         Mood and Affect: Mood normal.         Behavior: Behavior normal.          Assessment and Plan: 274}     1. Essential hypertension  Assessment & Plan:  Uncontrolled, /100 and 158/98 on recheck.  Did not want to increase med at last visit but has persistent uncontrolled HTN and has not lost any weight.  He continues to smoke and has no desire to quit.    Increase lisinopril " hydrochlorothiazide 20/12.5 mg to 2 tablets daily.  Continue home monitoring with a goal of < 130/80 and advised to call me if not meeting goal.  Have Kylie call him in one-week for remote BP reading.    Orders:  -     Discontinue: lisinopriL-hydrochlorothiazide (PRINZIDE,ZESTORETIC) 20-12.5 mg per tablet; Take 1 tablet by mouth once daily.  Dispense: 90 tablet; Refill: 1  -     lisinopriL-hydrochlorothiazide (PRINZIDE,ZESTORETIC) 20-12.5 mg per tablet; Take 2 tablets by mouth once daily.  Dispense: 180 tablet; Refill: 1    2. Flu vaccine need  -     influenza (Flulaval, Fluzone, Fluarix) 45 mcg/0.5 mL IM vaccine (> or = 6 mo) 0.5 mL    3. Chronic bilateral low back pain without sciatica  -     cyclobenzaprine (FLEXERIL) 10 MG tablet; Take 1 tablet (10 mg total) by mouth 3 (three) times daily as needed for Muscle spasms.  Dispense: 90 tablet; Refill: 2  -     ibuprofen (ADVIL,MOTRIN) 800 MG tablet; Take 1 tablet (800 mg total) by mouth 3 (three) times daily as needed for Pain. Take with food. Avoid all other NSAIDs.  Dispense: 90 tablet; Refill: 1    4. Type 2 diabetes mellitus without complication, without long-term current use of insulin  Overview:  Prediabetes diagnosed October of 2022 with the A1c 5.9%.  Had progressively increasing A1c despite treatment with metformin but insurance to in the denied additional therapy with Ozempic due to A1c only 6.4% 04/29/2024.      Assessment & Plan:  Lab Results   Component Value Date    HGBA1C 6.4 04/29/2024    HGBA1C 6.0 09/27/2023    HGBA1C 5.9 10/27/2022     Ozempic was declined. Off metformin currently.  Does not want to have updated A1c today.  Wants to wait until January.  Due to time constraints will get foot exam at next appointment in 2 months.        Smoking cessation advised.  Declines LDCT lung cancer screen.    Diagnosis, risks, benefits, and side effects of any meds and treatment plan were discussed with the patient.  Patient to call or follow-up with any  new or worsening symptoms or problems prior to next appointment.  Go to ER for any urgent complications.  All questions were answered to the satisfaction of the patient, and pt verbalized understanding and agreement to treatment plan.      Follow up in about 2 months (around 1/13/2025) for T2DM, hypertension, hyperlipidemia, with fasting labs.    Signature:  BUSTER Pratt-BC    Future Appointments   Date Time Provider Department Center   1/15/2025  8:00 AM Blessing Gilliland FNP [facility]   5/5/2025  7:40 AM Blessing Gillilnad FNP [facility]

## 2024-11-13 NOTE — ASSESSMENT & PLAN NOTE
Lab Results   Component Value Date    HGBA1C 6.4 04/29/2024    HGBA1C 6.0 09/27/2023    HGBA1C 5.9 10/27/2022     Ozempic was declined. Off metformin currently.  Does not want to have updated A1c today.  Wants to wait until January.  Due to time constraints will get foot exam at next appointment in 2 months.

## 2024-11-13 NOTE — ASSESSMENT & PLAN NOTE
Uncontrolled, /100 and 158/98 on recheck.  Did not want to increase med at last visit but has persistent uncontrolled HTN and has not lost any weight.  He continues to smoke and has no desire to quit.    Increase lisinopril hydrochlorothiazide 20/12.5 mg to 2 tablets daily.  Continue home monitoring with a goal of < 130/80 and advised to call me if not meeting goal.  Have Kylie call him in one-week for remote BP reading.

## 2024-11-27 ENCOUNTER — TELEPHONE (OUTPATIENT)
Dept: FAMILY MEDICINE | Facility: CLINIC | Age: 59
End: 2024-11-27
Payer: COMMERCIAL

## 2024-11-27 VITALS — DIASTOLIC BLOOD PRESSURE: 88 MMHG | SYSTOLIC BLOOD PRESSURE: 138 MMHG

## 2024-11-27 NOTE — TELEPHONE ENCOUNTER
----- Message from BUSTER Villa sent at 11/13/2024  5:55 PM CST -----  Regarding: Remote BP reading  Please call patient for remote BP reading in one-week.

## 2024-11-27 NOTE — TELEPHONE ENCOUNTER
Patient came for blood pressure check only 138/88.  Instructed patient to continue current medications and keep a blood pressure log.

## 2025-01-15 ENCOUNTER — OFFICE VISIT (OUTPATIENT)
Dept: FAMILY MEDICINE | Facility: CLINIC | Age: 60
End: 2025-01-15
Payer: COMMERCIAL

## 2025-01-15 VITALS
HEART RATE: 90 BPM | DIASTOLIC BLOOD PRESSURE: 83 MMHG | HEIGHT: 73 IN | BODY MASS INDEX: 31.14 KG/M2 | TEMPERATURE: 98 F | WEIGHT: 235 LBS | SYSTOLIC BLOOD PRESSURE: 132 MMHG | OXYGEN SATURATION: 100 % | RESPIRATION RATE: 18 BRPM

## 2025-01-15 DIAGNOSIS — G89.29 CHRONIC BILATERAL LOW BACK PAIN WITHOUT SCIATICA: Chronic | ICD-10-CM

## 2025-01-15 DIAGNOSIS — E11.9 TYPE 2 DIABETES MELLITUS WITHOUT COMPLICATION, WITHOUT LONG-TERM CURRENT USE OF INSULIN: Primary | Chronic | ICD-10-CM

## 2025-01-15 DIAGNOSIS — M54.50 CHRONIC BILATERAL LOW BACK PAIN WITHOUT SCIATICA: Chronic | ICD-10-CM

## 2025-01-15 DIAGNOSIS — E66.811 OBESITY, CLASS I, BMI 30-34.9: Chronic | ICD-10-CM

## 2025-01-15 DIAGNOSIS — I10 ESSENTIAL HYPERTENSION: Chronic | ICD-10-CM

## 2025-01-15 DIAGNOSIS — E78.2 MIXED HYPERLIPIDEMIA: Chronic | ICD-10-CM

## 2025-01-15 LAB
ALBUMIN SERPL BCP-MCNC: 4.1 G/DL (ref 3.4–4.8)
ALBUMIN/GLOB SERPL: 1.2 {RATIO}
ALP SERPL-CCNC: 98 U/L (ref 40–150)
ALT SERPL W P-5'-P-CCNC: 86 U/L
ANION GAP SERPL CALCULATED.3IONS-SCNC: 13 MMOL/L (ref 7–16)
AST SERPL W P-5'-P-CCNC: 52 U/L (ref 5–34)
BILIRUB SERPL-MCNC: 0.5 MG/DL
BUN SERPL-MCNC: 13 MG/DL (ref 8–26)
BUN/CREAT SERPL: 12 (ref 6–20)
CALCIUM SERPL-MCNC: 9.3 MG/DL (ref 8.8–10)
CHLORIDE SERPL-SCNC: 102 MMOL/L (ref 98–107)
CHOLEST SERPL-MCNC: 251 MG/DL
CHOLEST/HDLC SERPL: 7.8 {RATIO}
CO2 SERPL-SCNC: 30 MMOL/L (ref 23–31)
CREAT SERPL-MCNC: 1.06 MG/DL (ref 0.72–1.25)
CREAT UR-MCNC: 171 MG/DL (ref 23–375)
EGFR (NO RACE VARIABLE) (RUSH/TITUS): 80 ML/MIN/1.73M2
EST. AVERAGE GLUCOSE BLD GHB EST-MCNC: 157 MG/DL
GLOBULIN SER-MCNC: 3.5 G/DL (ref 2–4)
GLUCOSE SERPL-MCNC: 132 MG/DL (ref 82–115)
HBA1C MFR BLD HPLC: 7.1 %
HDLC SERPL-MCNC: 32 MG/DL (ref 35–60)
LDLC SERPL CALC-MCNC: 171 MG/DL
LDLC/HDLC SERPL: 5.3 {RATIO}
MICROALBUMIN UR-MCNC: 1.6 MG/DL
MICROALBUMIN/CREAT RATIO PNL UR: 9.4 MG/G (ref 0–30)
NONHDLC SERPL-MCNC: 219 MG/DL
POTASSIUM SERPL-SCNC: 5.6 MMOL/L (ref 3.5–5.1)
PROT SERPL-MCNC: 7.6 G/DL (ref 5.8–7.6)
SODIUM SERPL-SCNC: 139 MMOL/L (ref 136–145)
TRIGL SERPL-MCNC: 240 MG/DL (ref 34–140)
VLDLC SERPL-MCNC: 48 MG/DL

## 2025-01-15 PROCEDURE — 3075F SYST BP GE 130 - 139MM HG: CPT | Mod: ,,, | Performed by: NURSE PRACTITIONER

## 2025-01-15 PROCEDURE — 3008F BODY MASS INDEX DOCD: CPT | Mod: ,,, | Performed by: NURSE PRACTITIONER

## 2025-01-15 PROCEDURE — 4010F ACE/ARB THERAPY RXD/TAKEN: CPT | Mod: ,,, | Performed by: NURSE PRACTITIONER

## 2025-01-15 PROCEDURE — 1160F RVW MEDS BY RX/DR IN RCRD: CPT | Mod: ,,, | Performed by: NURSE PRACTITIONER

## 2025-01-15 PROCEDURE — 82043 UR ALBUMIN QUANTITATIVE: CPT | Mod: ,,, | Performed by: CLINICAL MEDICAL LABORATORY

## 2025-01-15 PROCEDURE — 80061 LIPID PANEL: CPT | Mod: ,,, | Performed by: CLINICAL MEDICAL LABORATORY

## 2025-01-15 PROCEDURE — 83036 HEMOGLOBIN GLYCOSYLATED A1C: CPT | Mod: ,,, | Performed by: CLINICAL MEDICAL LABORATORY

## 2025-01-15 PROCEDURE — 99214 OFFICE O/P EST MOD 30 MIN: CPT | Mod: ,,, | Performed by: NURSE PRACTITIONER

## 2025-01-15 PROCEDURE — 3079F DIAST BP 80-89 MM HG: CPT | Mod: ,,, | Performed by: NURSE PRACTITIONER

## 2025-01-15 PROCEDURE — 82570 ASSAY OF URINE CREATININE: CPT | Mod: ,,, | Performed by: CLINICAL MEDICAL LABORATORY

## 2025-01-15 PROCEDURE — 1159F MED LIST DOCD IN RCRD: CPT | Mod: ,,, | Performed by: NURSE PRACTITIONER

## 2025-01-15 PROCEDURE — 80053 COMPREHEN METABOLIC PANEL: CPT | Mod: ,,, | Performed by: CLINICAL MEDICAL LABORATORY

## 2025-01-15 RX ORDER — ATORVASTATIN CALCIUM 20 MG/1
20 TABLET, FILM COATED ORAL DAILY
Qty: 90 TABLET | Refills: 3 | Status: SHIPPED | OUTPATIENT
Start: 2025-01-15

## 2025-01-15 RX ORDER — PHENTERMINE HYDROCHLORIDE 37.5 MG/1
37.5 TABLET ORAL
Qty: 30 TABLET | Refills: 0 | Status: SHIPPED | OUTPATIENT
Start: 2025-01-15 | End: 2025-02-14

## 2025-01-15 RX ORDER — CYCLOBENZAPRINE HCL 10 MG
10 TABLET ORAL 3 TIMES DAILY PRN
Qty: 90 TABLET | Refills: 2 | Status: SHIPPED | OUTPATIENT
Start: 2025-01-15

## 2025-01-15 RX ORDER — LISINOPRIL AND HYDROCHLOROTHIAZIDE 12.5; 2 MG/1; MG/1
2 TABLET ORAL DAILY
Qty: 180 TABLET | Refills: 1 | Status: SHIPPED | OUTPATIENT
Start: 2025-01-15

## 2025-01-15 RX ORDER — IBUPROFEN 800 MG/1
800 TABLET ORAL 3 TIMES DAILY PRN
Qty: 90 TABLET | Refills: 1 | Status: SHIPPED | OUTPATIENT
Start: 2025-01-15

## 2025-01-15 NOTE — ASSESSMENT & PLAN NOTE
Lab Results   Component Value Date    HGBA1C 6.4 04/29/2024    HGBA1C 6.0 09/27/2023    HGBA1C 5.9 10/27/2022     Ozempic was denied by insurance due to A1c only 6.4% and was already on metformin.  Temporarily held metformin to re-evaluate.  Recheck labs today.

## 2025-01-15 NOTE — PROGRESS NOTES
[facility]  [address] [place] [address]  Phone: [phone]  Fax: [phone]       PATIENT NAME: Eric Gold   : 1965    AGE: 59 y.o. DATE OF ENCOUNTER: 1/15/25    MRN: [id number]      PCP: Blessing Gilliland FNP    Subjective:   CHANGE CHIEF COMPLAINT      :[id number]}274}  Chief Complaint   Patient presents with    Hypertension    Hyperlipidemia    Diabetes    Follow-up     Patient presents to clinic for 2 month follow up of HTN, HLD, and DM.  Patient states his prescription for the Lisinopril/HCTZ still says to take 1 tablet a day instead of 2 tablets daily    Health Maintenance     Care gaps address.  Patient declines RSV and covid.  Patient declines LDCT     History of Present Illness    HPI:  Patient reports taking 2 tablets daily of his prescribed medication, despite the pharmacy dispensing an incorrect dosage. He has approximately 6 pills remaining. He is attempting to reduce smoking, with the ultimate goal of cessation, though he is not prepared for complete cessation yet. He engages in extended daily walks with his dogs, which he believes benefits his health. Patient describes his overall condition as satisfactory. He perceives weight gain, attributing it to increased consumption of turkey and dressing during the holiday season. He reports recent cessation of Red Bull consumption. Patient acknowledges non-adherence to his cholesterol medication regimen and the need to refill several prescriptions including ibuprofen and Flexeril, indicating ongoing pain management requirements for chronic low back pain but generally has not been a problem.    Patient denies chest pain, shortness of breath, headaches, and dizziness. He denies having been diagnosed with sleep apnea.      ROS:  Constitutional: +weight gain, -dizziness  Head: -headaches  Respiratory: -shortness of breath, -cough  Cardiovascular: -chest pain         Allergies and Meds: 274}     Review of  patient's allergies indicates:  No Known Allergies     Current Outpatient Medications   Medication Sig Dispense Refill    atorvastatin (LIPITOR) 20 MG tablet Take 1 tablet (20 mg total) by mouth once daily. 90 tablet 3    cyclobenzaprine (FLEXERIL) 10 MG tablet Take 1 tablet (10 mg total) by mouth 3 (three) times daily as needed for Muscle spasms. 90 tablet 2    ibuprofen (ADVIL,MOTRIN) 800 MG tablet Take 1 tablet (800 mg total) by mouth 3 (three) times daily as needed for Pain. Take with food. Avoid all other NSAIDs. 90 tablet 1    lisinopriL-hydrochlorothiazide (PRINZIDE,ZESTORETIC) 20-12.5 mg per tablet Take 2 tablets by mouth once daily. 180 tablet 1    phentermine (ADIPEX-P) 37.5 mg tablet Take 1 tablet (37.5 mg total) by mouth before breakfast. 30 tablet 0     No current facility-administered medications for this visit.       Labs:274}   I have reviewed labs below:    Lab Results   Component Value Date    WBC 9.09 04/29/2024    RBC 5.22 04/29/2024    HGB 15.2 04/29/2024    HCT 46.6 04/29/2024     04/29/2024     04/29/2024    K 4.3 04/29/2024     04/29/2024    CALCIUM 9.4 04/29/2024    GLU 84 04/29/2024    BUN 10 04/29/2024    CREATININE 0.88 04/29/2024    EGFRNONAA 77 10/14/2021    ALT 60 04/29/2024    AST 32 04/29/2024    CHOL 178 04/29/2024    TRIG 181 (H) 04/29/2024    HDL 34 (L) 04/29/2024    LDLCALC 108 04/29/2024    TSH 1.340 04/29/2024    PSA 1.520 04/29/2024    HGBA1C 6.4 04/29/2024    MICROALBUR 0.7 04/29/2024       Point Of Care Testing (if applicable):  Lab Results   Component Value Date    [id number] Negative 10/20/2024    FLUAMOLEC Negative 10/20/2024    FLUBMOLEC Negative 10/20/2024    MOLSTREPAPOC Negative 10/20/2024     Any diagnostic testing results obtained in office or prior to appointment were reviewed were reviewed with patient.    Medical History: 274}     Past Medical History:   Diagnosis Date    Anxiety     Class 1 obesity due to excess calories with body mass index  "(BMI) of 32.0 to 32.9 in adult 9/8/2021    History of colon polyps 09/30/2020    Dr. Segovia, repeat 5 years    Hyperlipidemia     Hypertension     Kidney stone     Nicotine dependence, cigarettes, uncomplicated       Social History     Tobacco Use   Smoking Status Every Day    Current packs/day: 1.00    Average packs/day: 1 pack/day for 41.6 years (41.6 ttl pk-yrs)    Types: Cigarettes    Start date: 6/15/1983   Smokeless Tobacco Never      Past Surgical History:   Procedure Laterality Date    APPENDECTOMY      COLONOSCOPY      SHOULDER ARTHROSCOPY W/ ROTATOR CUFF REPAIR          Health Maintenance:      Health Maintenance Topics with due status: Not Due       Topic Last Completion Date    Colorectal Cancer Screening 09/30/2020    PROSTATE-SPECIFIC ANTIGEN 04/29/2024    TETANUS VACCINE 04/29/2024    Diabetes Urine Screening 04/29/2024    Lipid Panel 04/29/2024    Low Dose Statin 01/15/2025    Foot Exam 01/15/2025       Objective:  274}   Vital Signs  Temp: 97.9 °F (36.6 °C)  Temp Source: Oral  Pulse: 90  Resp: 18  SpO2: 100 %  BP: 132/83  BP Location: Left arm  Patient Position: Sitting  Pain Score: 0-No pain  Height and Weight  Height: 6' 1" (185.4 cm)  Weight: 106.6 kg (235 lb)  BSA (Calculated - sq m): 2.34 sq meters  BMI (Calculated): 31  Weight in (lb) to have BMI = 25: 189.1    Over the last two weeks how often have you been bothered by little interest or pleasure in doing things: 0  Over the last two weeks how often have you been bothered by feeling down, depressed or hopeless: 0  PHQ-2 Total Score: 0    Wt Readings from Last 3 Encounters:   01/15/25 106.6 kg (235 lb)   11/13/24 107.5 kg (237 lb)   10/20/24 104.3 kg (230 lb)     Physical Exam  Vitals and nursing note reviewed.   Constitutional:       General: He is not in acute distress.     Appearance: Normal appearance. He is not ill-appearing.   HENT:      Head: Normocephalic.      Right Ear: Tympanic membrane, ear canal and external ear normal.      Left " Ear: Tympanic membrane, ear canal and external ear normal.      Nose: Nose normal.      Mouth/Throat:      Mouth: Mucous membranes are moist.      Pharynx: Oropharynx is clear. Uvula midline. No posterior oropharyngeal erythema or uvula swelling.   Eyes:      Conjunctiva/sclera: Conjunctivae normal.      Pupils: Pupils are equal, round, and reactive to light.   Neck:      Thyroid: No thyromegaly.      Vascular: Normal carotid pulses. No carotid bruit.      Trachea: Trachea normal.   Cardiovascular:      Rate and Rhythm: Normal rate and regular rhythm.      Pulses:           Dorsalis pedis pulses are 3+ on the right side and 3+ on the left side.        Posterior tibial pulses are 3+ on the right side and 3+ on the left side.      Heart sounds: Normal heart sounds.   Pulmonary:      Effort: Pulmonary effort is normal. No respiratory distress.      Breath sounds: Normal breath sounds. No wheezing, rhonchi or rales.   Abdominal:      Palpations: Abdomen is soft.      Tenderness: There is no abdominal tenderness.   Musculoskeletal:      Cervical back: Neck supple.      Right lower leg: No edema.      Left lower leg: No edema.      Right foot: Normal range of motion. No deformity or bunion.      Left foot: Normal range of motion. No deformity or bunion.   Feet:      Right foot:      Protective Sensation: 6 sites tested.  6 sites sensed.      Skin integrity: Skin integrity normal. No ulcer, blister, erythema, warmth or callus.      Toenail Condition: Right toenails are normal.      Left foot:      Protective Sensation: 6 sites tested.  6 sites sensed.      Skin integrity: Skin integrity normal. No ulcer, blister, erythema, warmth or callus.      Toenail Condition: Left toenails are normal.   Lymphadenopathy:      Cervical: No cervical adenopathy.      Upper Body:      Right upper body: No supraclavicular adenopathy.      Left upper body: No supraclavicular adenopathy.   Skin:     General: Skin is warm and dry.       Coloration: Skin is not jaundiced or pale.      Findings: No rash.   Neurological:      General: No focal deficit present.      Mental Status: He is alert and oriented to person, place, and time.      Gait: Gait normal.   Psychiatric:         Mood and Affect: Mood normal.         Behavior: Behavior normal.          Assessment and Plan: 274}       1. Type 2 diabetes mellitus without complication, without long-term current use of insulin  Overview:  Prediabetes diagnosed October of 2022 with the A1c 5.9%.  Had progressively increasing A1c despite treatment with metformin but insurance to in the denied additional therapy with Ozempic due to A1c only 6.4% 04/29/2024.      Assessment & Plan:  Lab Results   Component Value Date    HGBA1C 6.4 04/29/2024    HGBA1C 6.0 09/27/2023    HGBA1C 5.9 10/27/2022     Ozempic was denied by insurance due to A1c only 6.4% and was already on metformin.  Temporarily held metformin to re-evaluate.  Recheck labs today.    Orders:  -     Comprehensive Metabolic Panel; Future; Expected date: 01/15/2025  -     Hemoglobin A1C; Future; Expected date: 01/15/2025  -     Microalbumin/Creatinine Ratio, Urine; Future; Expected date: 01/15/2025    2. Essential hypertension  -     Comprehensive Metabolic Panel; Future; Expected date: 01/15/2025  -     lisinopriL-hydrochlorothiazide (PRINZIDE,ZESTORETIC) 20-12.5 mg per tablet; Take 2 tablets by mouth once daily.  Dispense: 180 tablet; Refill: 1    3. Mixed hyperlipidemia  -     Comprehensive Metabolic Panel; Future; Expected date: 01/15/2025  -     Lipid Panel; Future; Expected date: 01/15/2025  -     atorvastatin (LIPITOR) 20 MG tablet; Take 1 tablet (20 mg total) by mouth once daily.  Dispense: 90 tablet; Refill: 3    4. Obesity, Class I, BMI 30-34.9  -     phentermine (ADIPEX-P) 37.5 mg tablet; Take 1 tablet (37.5 mg total) by mouth before breakfast.  Dispense: 30 tablet; Refill: 0    5. Chronic bilateral low back pain without sciatica  -      cyclobenzaprine (FLEXERIL) 10 MG tablet; Take 1 tablet (10 mg total) by mouth 3 (three) times daily as needed for Muscle spasms.  Dispense: 90 tablet; Refill: 2  -     ibuprofen (ADVIL,MOTRIN) 800 MG tablet; Take 1 tablet (800 mg total) by mouth 3 (three) times daily as needed for Pain. Take with food. Avoid all other NSAIDs.  Dispense: 90 tablet; Refill: 1        Assessment & Plan    IMPRESSION:  - Reviewed A1C trend; previously increasing despite metformin therapy  - Considering GLP-1 therapy for diabetes management and potential cardiovascular benefits, pending A1C results and insurance approval  - Evaluated need for sleep apnea testing  - Assessed cardiovascular risk factors, including smoking and potential arterial plaque  - Considered restarting Adipex (phentermine) for weight management    DIABETES:  - Ordered A1C test to reassess diabetes management.  - Educated the patient on the importance of regular eye exams for diabetics.  Request 2024 eye exam from [facility] and determine if it is time for an updated exam.  - Discussed preventative benefits of GLP-1 agonists in reducing risk of cardiac complications.  - Performed annual foot exam for diabetes management.  - Will reassess treatment options based on new A1C results.  - Noted patient's previous use of metformin for diabetes management.  - Documented patient's discontinuation of metformin due to increasing A1C despite treatment.  - Planned to reassess A1C and consider new treatment options based on results.    HYPERTENSION:  - Increased lisinopril dosage to 2 tablets daily, with a dispense quantity of 180 and 1 refill.  - Noted blood pressure improvement to 132/83.  - Educated the patient on the importance of regular eye exams for hypertensive patients.  - Confirmed lisinopril's effectiveness in controlling blood pressure.  - Planned to resend lisinopril hydrochlorothiazide prescription to maintain current treatment with 2 tablets  daily.    HYPERLIPIDEMIA:  - Continued current cholesterol medication.  - Reminded the patient to  cholesterol medication, as they are past due.    BACK PAIN:  - Continued ibuprofen and cyclobenzaprine for back pain management.  - Inquired about the patient's back condition.    SMOKING:  - Inquired about the patient's smoking status.  - Noted the patient's efforts to reduce smoking.  - Encouraged the patient to continue efforts to reduce smoking, with the ultimate goal of cessation.  - Discussed low-dose CT screening for lung cancer, which the patient declined.  - Acknowledged the difficulty of the smoking cessation process.    SLEEP APNEA:  - Explained potential benefits of weight loss medications on sleep apnea management.  Patient declines sleep apnea testing but has multiple risk factors for JESENIA.    WEIGHT MANAGEMENT:  - Prescription monitoring network reviewed without suspicious behavior. Restarted phentermine times one-mth then re-evaluate for weight management.  - Scheduled follow up in 1 month to assess weight loss progress with phentermine and review lab results.  - Patient to maintain current exercise routine, including daily dog walking.    RSV VACCINATION:  - Informed about availability and eligibility for RSV vaccination at local pharmacy due to age 59.    COVID-19 VACCINATION:  - Informed about availability and eligibility for COVID-19 vaccination.    LABS:  - Labs ordered.    FOLLOW UP:  - Scheduled follow up in 1 month to assess weight loss progress with phentermine and review lab results for T2DM and HLD.       Follow up in about 1 month (around 2/15/2025) for Adipex, weight management.    Signature:  BUSTER Pratt-BC    Future Appointments   Date Time Provider Department Center   2/19/2025 11:40 AM Blessing Gilliland FNP [facility]   5/5/2025  7:40 AM Blessing Gilliland FNP [facility]     This note was generated with the assistance of [facility]  listening technology. Verbal consent was obtained by the patient and accompanying visitor(s) for the recording of patient appointment to facilitate this note. I attest to having reviewed and edited the generated note for accuracy, though some syntax or spelling errors may persist. Please contact the author of this note for any clarification.

## 2025-01-16 ENCOUNTER — PATIENT OUTREACH (OUTPATIENT)
Facility: HOSPITAL | Age: 60
End: 2025-01-16
Payer: COMMERCIAL

## 2025-01-16 NOTE — PROGRESS NOTES
Population Health Chart Review & Patient Outreach Details    Updates Requested / Reviewed:  [x]  Care Team Updated    Health Maintenance Topics Addressed and Outreach Outcomes / Actions Taken:  Diabetic Eye Exam [x] SHAVON sent to [facility].

## 2025-01-16 NOTE — LETTER
AUTHORIZATION FOR RELEASE OF   CONFIDENTIAL INFORMATION    Dear [facility],    We are seeing Eric Gold, date of birth 1965, in the clinic at [facility]. Blessing Gilliland FNP is the patient's PCP. Eric Gold has an outstanding lab/procedure at the time we reviewed his chart. In order to help keep his health information updated, he has authorized us to request the following medical record(s):        (  )  MAMMOGRAM                                      (  )  COLONOSCOPY      (  )  PAP SMEAR                                          (  )  OUTSIDE LAB RESULTS     (  )  DEXA SCAN                                          ( x )  EYE EXAM            (  )  FOOT EXAM                                          (  )  ENTIRE RECORD     (  )  OUTSIDE IMMUNIZATIONS                 (  )  _______________         Please fax records to Erika Singh LPN Care Coordinator at [phone].      If you have any questions, please contact Erika at [phone].           Patient Name: Eric Gold  : 1965  Patient Phone #: [phone]

## 2025-01-21 DIAGNOSIS — E11.65 TYPE 2 DIABETES MELLITUS WITH HYPERGLYCEMIA, WITHOUT LONG-TERM CURRENT USE OF INSULIN: Primary | Chronic | ICD-10-CM

## 2025-01-21 RX ORDER — TIRZEPATIDE 2.5 MG/.5ML
2.5 INJECTION, SOLUTION SUBCUTANEOUS
Qty: 4 PEN | Refills: 0 | Status: SHIPPED | OUTPATIENT
Start: 2025-01-21

## 2025-01-22 NOTE — ASSESSMENT & PLAN NOTE
Lab Results   Component Value Date    HGBA1C 7.1 (H) 01/15/2025    HGBA1C 6.4 04/29/2024    HGBA1C 6.0 09/27/2023     Ozempic and Mounjaro were previously denied by insurance due to A1c only 6.4% and was already on metformin.      A1c has increased.  Continue metformin and add Mounjaro or Ozempic.

## 2025-02-20 ENCOUNTER — OFFICE VISIT (OUTPATIENT)
Dept: FAMILY MEDICINE | Facility: CLINIC | Age: 60
End: 2025-02-20
Payer: COMMERCIAL

## 2025-02-20 VITALS
HEART RATE: 103 BPM | BODY MASS INDEX: 30.22 KG/M2 | TEMPERATURE: 98 F | SYSTOLIC BLOOD PRESSURE: 137 MMHG | OXYGEN SATURATION: 98 % | RESPIRATION RATE: 18 BRPM | DIASTOLIC BLOOD PRESSURE: 89 MMHG | HEIGHT: 73 IN | WEIGHT: 228 LBS

## 2025-02-20 DIAGNOSIS — E78.2 MIXED HYPERLIPIDEMIA: Chronic | ICD-10-CM

## 2025-02-20 DIAGNOSIS — E11.65 TYPE 2 DIABETES MELLITUS WITH HYPERGLYCEMIA, WITHOUT LONG-TERM CURRENT USE OF INSULIN: Primary | Chronic | ICD-10-CM

## 2025-02-20 DIAGNOSIS — I10 ESSENTIAL HYPERTENSION: Chronic | ICD-10-CM

## 2025-02-20 DIAGNOSIS — E66.811 OBESITY, CLASS I, BMI 30-34.9: Chronic | ICD-10-CM

## 2025-02-20 RX ORDER — METFORMIN HYDROCHLORIDE 500 MG/1
500 TABLET, EXTENDED RELEASE ORAL
Qty: 90 TABLET | Refills: 3 | Status: SHIPPED | OUTPATIENT
Start: 2025-02-20 | End: 2026-02-20

## 2025-02-20 RX ORDER — TIRZEPATIDE 5 MG/.5ML
5 INJECTION, SOLUTION SUBCUTANEOUS
Qty: 2 ML | Refills: 2 | Status: SHIPPED | OUTPATIENT
Start: 2025-02-20

## 2025-02-20 NOTE — PROGRESS NOTES
[facility]  [address] [place] [address]  Phone: [phone]  Fax: [phone]       PATIENT NAME: Eric Gold   : 1965    AGE: 59 y.o. DATE OF ENCOUNTER: 25    MRN: [id number]      PCP: Blessing Gilliland FNP    Subjective:   CHANGE CHIEF COMPLAINT      :[id number]}274}  Chief Complaint   Patient presents with    Weight Management NEW     Patient presents to clinic for 1 month follow up of weight management     History of Present Illness    HPI:  Patient reports today is his third dose of Mounjaro 2.5 mg for diabetes, which he started approximately 1 month ago. He had side effects with the medication, including queasiness and nausea after the first injection, lasting about 2 days. During the second week, the side effects lasted only 1 day. For today's injection (third dose), he reports no side effects. Patient has lost 7 lbs since starting the medication, although he did not perceive the weight loss. He has not been checking his blood sugar at home due to lack of a glucose meter. He has not been taking Metformin as previously prescribed. Patient reports a family history of diabetes and a personal history of pre-diabetes. He mentions his liver tests were elevated on the last set of labs, though he has not been previously diagnosed with fatty liver. Patient denies any current symptoms related to his diabetes.      ROS:  Constitutional: +weight loss, -change in weight  Gastrointestinal: +nausea         Allergies and Meds: 274}     Review of patient's allergies indicates:  No Known Allergies     Current Outpatient Medications   Medication Sig Dispense Refill    atorvastatin (LIPITOR) 20 MG tablet Take 1 tablet (20 mg total) by mouth once daily. 90 tablet 3    cyclobenzaprine (FLEXERIL) 10 MG tablet Take 1 tablet (10 mg total) by mouth 3 (three) times daily as needed for Muscle spasms. 90 tablet 2    ibuprofen (ADVIL,MOTRIN) 800 MG tablet Take 1 tablet (800 mg  total) by mouth 3 (three) times daily as needed for Pain. Take with food. Avoid all other NSAIDs. 90 tablet 1    lisinopriL-hydrochlorothiazide (PRINZIDE,ZESTORETIC) 20-12.5 mg per tablet Take 2 tablets by mouth once daily. 180 tablet 1    metFORMIN (GLUCOPHAGE-XR) 500 MG ER 24hr tablet Take 1 tablet (500 mg total) by mouth daily with dinner or evening meal. 90 tablet 3    tirzepatide (MOUNJARO) 5 mg/0.5 mL PnIj Inject 5 mg into the skin every 7 days. 2 mL 2     No current facility-administered medications for this visit.       Labs:274}   I have reviewed labs below:    Lab Results   Component Value Date    WBC 9.09 04/29/2024    RBC 5.22 04/29/2024    HGB 15.2 04/29/2024    HCT 46.6 04/29/2024     04/29/2024     01/15/2025    K 5.6 (H) 01/15/2025     01/15/2025    CALCIUM 9.3 01/15/2025     (H) 01/15/2025    BUN 13 01/15/2025    CREATININE 1.06 01/15/2025    EGFRNONAA 77 10/14/2021    ALT 86 (H) 01/15/2025    AST 52 (H) 01/15/2025    CHOL 251 (H) 01/15/2025    TRIG 240 (H) 01/15/2025    HDL 32 (L) 01/15/2025    LDLCALC 171 01/15/2025    TSH 1.340 04/29/2024    PSA 1.520 04/29/2024    HGBA1C 7.1 (H) 01/15/2025    MICROALBUR 1.6 01/15/2025     Medical History: 274}     Past Medical History:   Diagnosis Date    Anxiety     Class 1 obesity due to excess calories with body mass index (BMI) of 32.0 to 32.9 in adult 9/8/2021    History of colon polyps 09/30/2020    Dr. Segovia, repeat 5 years    Hyperlipidemia     Hypertension     Kidney stone     Nicotine dependence, cigarettes, uncomplicated       Social History  Tobacco Use   Smoking Status Every Day    Current packs/day: 1.00    Average packs/day: 1 pack/day for 41.7 years (41.7 ttl pk-yrs)    Types: Cigarettes    Start date: 6/15/1983   Smokeless Tobacco Never      Past Surgical History:   Procedure Laterality Date    APPENDECTOMY      COLONOSCOPY      SHOULDER ARTHROSCOPY W/ ROTATOR CUFF REPAIR          Health Maintenance:      Health  "Maintenance Topics with due status: Not Due       Topic Last Completion Date    Colorectal Cancer Screening 09/30/2020    PROSTATE-SPECIFIC ANTIGEN 04/29/2024    TETANUS VACCINE 04/29/2024    Diabetes Urine Screening 01/15/2025    Foot Exam 01/15/2025    Lipid Panel 01/15/2025    Hemoglobin A1c 01/15/2025    Low Dose Statin 02/20/2025     Declines LDCT lung cancer screen.    Objective:  274}   Vital Signs  Temp: 97.8 °F (36.6 °C)  Temp Source: Oral  Pulse: 103  Resp: 18  SpO2: 98 %  BP: 137/89  BP Location: Left arm  Patient Position: Sitting  Pain Score: 0-No pain  Height and Weight  Height: 6' 1" (185.4 cm)  Weight: 103.4 kg (228 lb)  BSA (Calculated - sq m): 2.31 sq meters  BMI (Calculated): 30.1  Weight in (lb) to have BMI = 25: 189.1    Over the last two weeks how often have you been bothered by little interest or pleasure in doing things: 0  Over the last two weeks how often have you been bothered by feeling down, depressed or hopeless: 0  PHQ-2 Total Score: 0    Wt Readings from Last 3 Encounters:   02/20/25 103.4 kg (228 lb)   01/15/25 106.6 kg (235 lb)   11/13/24 107.5 kg (237 lb)     Physical Exam  Vitals and nursing note reviewed.   Constitutional:       General: He is not in acute distress.     Appearance: Normal appearance. He is obese. He is not ill-appearing.   HENT:      Head: Normocephalic.   Eyes:      Conjunctiva/sclera: Conjunctivae normal.   Cardiovascular:      Rate and Rhythm: Normal rate and regular rhythm.      Heart sounds: Normal heart sounds.   Pulmonary:      Effort: Pulmonary effort is normal. No respiratory distress.      Breath sounds: Normal breath sounds.   Skin:     General: Skin is warm and dry.      Coloration: Skin is not jaundiced or pale.   Neurological:      Mental Status: He is alert and oriented to person, place, and time.      Gait: Gait normal.   Psychiatric:         Mood and Affect: Mood normal.         Behavior: Behavior normal.         Thought Content: Thought content " normal.         Judgment: Judgment normal.          Assessment and Plan: 274}       1. Type 2 diabetes mellitus with hyperglycemia, without long-term current use of insulin  Overview:  Prediabetes diagnosed October of 2022 with the A1c 5.9%.  Had progressively increasing A1c despite treatment with metformin but insurance to in the denied additional therapy with Ozempic due to A1c only 6.4% 04/29/2024.      Assessment & Plan:  Lab Results   Component Value Date    HGBA1C 7.1 (H) 01/15/2025    HGBA1C 6.4 04/29/2024    HGBA1C 6.0 09/27/2023     GLP-1 denied by insurance company April 2024 due to A1c only 6.4%.  1/15/25 A1c progressed on metformin alone to 7.1%, Mounjaro approved    Continue Mounjaro, increase to 5 mg.  Resume metformin.    Orders:  -     tirzepatide (MOUNJARO) 5 mg/0.5 mL PnIj; Inject 5 mg into the skin every 7 days.  Dispense: 2 mL; Refill: 2  -     metFORMIN (GLUCOPHAGE-XR) 500 MG ER 24hr tablet; Take 1 tablet (500 mg total) by mouth daily with dinner or evening meal.  Dispense: 90 tablet; Refill: 3    2. Obesity, Class I, BMI 30-34.9    3. Mixed hyperlipidemia    4. Essential hypertension  Assessment & Plan:  BP better on lisinopril hydrochlorothiazide 20/12.5 mg 2 tablets daily           Assessment & Plan    IMPRESSION:  - Continued Mounjaro for diabetes management, with added benefit of weight loss  - Increased Mounjaro dose from 2.5mg to 5mg  - Restarted Metformin in conjunction with Mounjaro for improved glycemic control  - Monitoring A1C, aiming for target below 6.5%  - Suspect fatty liver based on elevated liver enzymes, BMI >30, and diabetes diagnosis  - Plan to reassess liver enzymes and consider ultrasound if still elevated at next visit    DIABETES:  - Explained the GLP-1 component in Mounjaro and its cardiac benefits to the patient.  - Discussed the importance of hydration for diabetes management and nausea prevention.  - Educated the patient on the importance of protein intake while on  Mounjaro.  - Increased Mounjaro dosage from 2.5mg to 5mg monthly injection.  - Provided 2 refills for Mounjaro 5mg.  - Restarted Metformin, instructing the patient to take it avoiding supper time.  - Recommend checking glucose levels once or twice weekly to monitor between visits.  - Noted that the patient has lost 7 lbs since starting treatment.  - Set A1C goal to below 6.5.    FATTY LIVER DISEASE:  - Informed the patient about fatty liver, its relation to diabetes and obesity, and potential progression to cirrhosis.  - Recommend focusing on weight loss to address fatty liver.  - Advised the patient to avoid excessive acetaminophen and alcohol consumption.  - Suspected fatty liver based on diabetes diagnosis, BMI above 30, and elevated liver enzymes.  - Will consider liver ultrasound if liver enzymes remain elevated at next visit.  - Ordered repeat labs including liver function tests for next visit on May 5th.    HYPERLIPIDEMIA:  - Will recheck cholesterol levels at the next visit on May 5th.  - Current treatment focuses on managing diabetes and weight loss, which may improve lipid profile.    WEIGHT MANAGEMENT:  - Patient to increase water intake, sipping throughout the day.  - Patient to ensure adequate protein intake in diet.  - Recommend focusing on weight loss to address fatty liver and diabetes.    FOLLOW UP:  - Scheduled follow-up visit on May 5th for repeat labs and reassessment.  - Instructed the patient to contact the office if any problems arise in obtaining Mounjaro or if any questions occur before the next appointment.       Follow up in about 2 months (around 5/5/2025) for Healthy You, T2DM, hyperlipidemia, hypertension, with fasting labs.    Signature:  BUSTER Pratt-BC    Future Appointments   Date Time Provider Department [place]   5/5/2025  7:40 AM Blessing Gilliland FNP [facility]     This note was generated with the assistance of ambient listening technology. Verbal  consent was obtained by the patient and accompanying visitor(s) for the recording of patient appointment to facilitate this note. I attest to having reviewed and edited the generated note for accuracy, though some syntax or spelling errors may persist. Please contact the author of this note for any clarification.

## 2025-02-20 NOTE — ASSESSMENT & PLAN NOTE
Lab Results   Component Value Date    HGBA1C 7.1 (H) 01/15/2025    HGBA1C 6.4 04/29/2024    HGBA1C 6.0 09/27/2023     GLP-1 denied by insurance company April 2024 due to A1c only 6.4%.  1/15/25 A1c progressed on metformin alone to 7.1%, Mounjaro approved    Continue Mounjaro, increase to 5 mg.  Resume metformin.

## 2025-05-05 ENCOUNTER — OFFICE VISIT (OUTPATIENT)
Dept: FAMILY MEDICINE | Facility: CLINIC | Age: 60
End: 2025-05-05
Payer: COMMERCIAL

## 2025-05-05 VITALS
TEMPERATURE: 98 F | DIASTOLIC BLOOD PRESSURE: 72 MMHG | BODY MASS INDEX: 28.47 KG/M2 | OXYGEN SATURATION: 99 % | HEIGHT: 73 IN | SYSTOLIC BLOOD PRESSURE: 114 MMHG | HEART RATE: 91 BPM | WEIGHT: 214.81 LBS | RESPIRATION RATE: 18 BRPM

## 2025-05-05 DIAGNOSIS — Z13.220 SCREENING FOR HYPERLIPIDEMIA: ICD-10-CM

## 2025-05-05 DIAGNOSIS — Z13.1 DIABETES MELLITUS SCREENING: ICD-10-CM

## 2025-05-05 DIAGNOSIS — E78.2 MIXED HYPERLIPIDEMIA: Chronic | ICD-10-CM

## 2025-05-05 DIAGNOSIS — I10 ESSENTIAL HYPERTENSION: ICD-10-CM

## 2025-05-05 DIAGNOSIS — E11.65 TYPE 2 DIABETES MELLITUS WITH HYPERGLYCEMIA, WITHOUT LONG-TERM CURRENT USE OF INSULIN: Chronic | ICD-10-CM

## 2025-05-05 DIAGNOSIS — Z00.00 ROUTINE GENERAL MEDICAL EXAMINATION AT A HEALTH CARE FACILITY: Primary | ICD-10-CM

## 2025-05-05 DIAGNOSIS — Z12.5 PROSTATE CANCER SCREENING: ICD-10-CM

## 2025-05-05 DIAGNOSIS — F17.210 NICOTINE DEPENDENCE, CIGARETTES, UNCOMPLICATED: Chronic | ICD-10-CM

## 2025-05-05 LAB
ALBUMIN SERPL BCP-MCNC: 4.3 G/DL (ref 3.4–4.8)
ALBUMIN/GLOB SERPL: 1.1 {RATIO}
ALP SERPL-CCNC: 82 U/L (ref 40–150)
ALT SERPL W P-5'-P-CCNC: 51 U/L
ANION GAP SERPL CALCULATED.3IONS-SCNC: 17 MMOL/L (ref 7–16)
AST SERPL W P-5'-P-CCNC: 37 U/L (ref 11–45)
BASOPHILS # BLD AUTO: 0.04 K/UL (ref 0–0.2)
BASOPHILS NFR BLD AUTO: 0.4 % (ref 0–1)
BILIRUB SERPL-MCNC: 0.6 MG/DL
BUN SERPL-MCNC: 13 MG/DL (ref 8–26)
BUN/CREAT SERPL: 11 (ref 6–20)
CALCIUM SERPL-MCNC: 9.8 MG/DL (ref 8.8–10)
CHLORIDE SERPL-SCNC: 100 MMOL/L (ref 98–107)
CHOLEST SERPL-MCNC: 166 MG/DL
CHOLEST/HDLC SERPL: 5.5 {RATIO}
CO2 SERPL-SCNC: 25 MMOL/L (ref 23–31)
CREAT SERPL-MCNC: 1.22 MG/DL (ref 0.72–1.25)
DIFFERENTIAL METHOD BLD: ABNORMAL
EGFR (NO RACE VARIABLE) (RUSH/TITUS): 68 ML/MIN/1.73M2
EOSINOPHIL # BLD AUTO: 0.13 K/UL (ref 0–0.5)
EOSINOPHIL NFR BLD AUTO: 1.3 % (ref 1–4)
ERYTHROCYTE [DISTWIDTH] IN BLOOD BY AUTOMATED COUNT: 13 % (ref 11.5–14.5)
EST. AVERAGE GLUCOSE BLD GHB EST-MCNC: 114 MG/DL
GLOBULIN SER-MCNC: 3.8 G/DL (ref 2–4)
GLUCOSE SERPL-MCNC: 84 MG/DL (ref 82–115)
HBA1C MFR BLD HPLC: 5.6 %
HCT VFR BLD AUTO: 51.1 % (ref 40–54)
HDLC SERPL-MCNC: 30 MG/DL (ref 35–60)
HGB BLD-MCNC: 17.2 G/DL (ref 13.5–18)
IMM GRANULOCYTES # BLD AUTO: 0.02 K/UL (ref 0–0.04)
IMM GRANULOCYTES NFR BLD: 0.2 % (ref 0–0.4)
LDLC SERPL CALC-MCNC: 90 MG/DL
LDLC/HDLC SERPL: 3 {RATIO}
LYMPHOCYTES # BLD AUTO: 3.44 K/UL (ref 1–4.8)
LYMPHOCYTES NFR BLD AUTO: 34.2 % (ref 27–41)
MCH RBC QN AUTO: 29.1 PG (ref 27–31)
MCHC RBC AUTO-ENTMCNC: 33.7 G/DL (ref 32–36)
MCV RBC AUTO: 86.3 FL (ref 80–96)
MONOCYTES # BLD AUTO: 0.84 K/UL (ref 0–0.8)
MONOCYTES NFR BLD AUTO: 8.4 % (ref 2–6)
MPC BLD CALC-MCNC: 10.4 FL (ref 9.4–12.4)
NEUTROPHILS # BLD AUTO: 5.58 K/UL (ref 1.8–7.7)
NEUTROPHILS NFR BLD AUTO: 55.5 % (ref 53–65)
NONHDLC SERPL-MCNC: 136 MG/DL
NRBC # BLD AUTO: 0 X10E3/UL
NRBC, AUTO (.00): 0 %
PLATELET # BLD AUTO: 360 K/UL (ref 150–400)
POTASSIUM SERPL-SCNC: 3.7 MMOL/L (ref 3.5–5.1)
PROT SERPL-MCNC: 8.1 G/DL (ref 5.8–7.6)
PSA SERPL-MCNC: 1.43 NG/ML
RBC # BLD AUTO: 5.92 M/UL (ref 4.6–6.2)
SODIUM SERPL-SCNC: 138 MMOL/L (ref 136–145)
TRIGL SERPL-MCNC: 230 MG/DL (ref 34–140)
VLDLC SERPL-MCNC: 46 MG/DL
WBC # BLD AUTO: 10.05 K/UL (ref 4.5–11)

## 2025-05-05 PROCEDURE — 80061 LIPID PANEL: CPT | Mod: ,,, | Performed by: CLINICAL MEDICAL LABORATORY

## 2025-05-05 PROCEDURE — 4010F ACE/ARB THERAPY RXD/TAKEN: CPT | Mod: ,,, | Performed by: NURSE PRACTITIONER

## 2025-05-05 PROCEDURE — G0103 PSA SCREENING: HCPCS | Mod: ,,, | Performed by: CLINICAL MEDICAL LABORATORY

## 2025-05-05 PROCEDURE — 80053 COMPREHEN METABOLIC PANEL: CPT | Mod: ,,, | Performed by: CLINICAL MEDICAL LABORATORY

## 2025-05-05 PROCEDURE — 3061F NEG MICROALBUMINURIA REV: CPT | Mod: ,,, | Performed by: NURSE PRACTITIONER

## 2025-05-05 PROCEDURE — 1160F RVW MEDS BY RX/DR IN RCRD: CPT | Mod: ,,, | Performed by: NURSE PRACTITIONER

## 2025-05-05 PROCEDURE — 3074F SYST BP LT 130 MM HG: CPT | Mod: ,,, | Performed by: NURSE PRACTITIONER

## 2025-05-05 PROCEDURE — 3066F NEPHROPATHY DOC TX: CPT | Mod: ,,, | Performed by: NURSE PRACTITIONER

## 2025-05-05 PROCEDURE — 3078F DIAST BP <80 MM HG: CPT | Mod: ,,, | Performed by: NURSE PRACTITIONER

## 2025-05-05 PROCEDURE — 85025 COMPLETE CBC W/AUTO DIFF WBC: CPT | Mod: ,,, | Performed by: CLINICAL MEDICAL LABORATORY

## 2025-05-05 PROCEDURE — 99396 PREV VISIT EST AGE 40-64: CPT | Mod: ,,, | Performed by: NURSE PRACTITIONER

## 2025-05-05 PROCEDURE — 3008F BODY MASS INDEX DOCD: CPT | Mod: ,,, | Performed by: NURSE PRACTITIONER

## 2025-05-05 PROCEDURE — 1159F MED LIST DOCD IN RCRD: CPT | Mod: ,,, | Performed by: NURSE PRACTITIONER

## 2025-05-05 PROCEDURE — 83036 HEMOGLOBIN GLYCOSYLATED A1C: CPT | Mod: ,,, | Performed by: CLINICAL MEDICAL LABORATORY

## 2025-05-05 PROCEDURE — 3051F HG A1C>EQUAL 7.0%<8.0%: CPT | Mod: ,,, | Performed by: NURSE PRACTITIONER

## 2025-05-05 RX ORDER — LISINOPRIL AND HYDROCHLOROTHIAZIDE 12.5; 2 MG/1; MG/1
2 TABLET ORAL DAILY
Qty: 180 TABLET | Refills: 1 | Status: SHIPPED | OUTPATIENT
Start: 2025-05-05

## 2025-05-05 RX ORDER — ATORVASTATIN CALCIUM 20 MG/1
20 TABLET, FILM COATED ORAL DAILY
Qty: 90 TABLET | Refills: 3 | Status: SHIPPED | OUTPATIENT
Start: 2025-05-05

## 2025-05-05 RX ORDER — METFORMIN HYDROCHLORIDE 500 MG/1
500 TABLET, EXTENDED RELEASE ORAL
Qty: 90 TABLET | Refills: 3 | Status: SHIPPED | OUTPATIENT
Start: 2025-05-05 | End: 2026-05-05

## 2025-05-05 RX ORDER — TIRZEPATIDE 5 MG/.5ML
5 INJECTION, SOLUTION SUBCUTANEOUS
Qty: 2 ML | Refills: 5 | Status: SHIPPED | OUTPATIENT
Start: 2025-05-05

## 2025-05-05 NOTE — PROGRESS NOTES
[facility]  [address] [place] [address]  Phone: [phone]  Fax: [phone]     PATIENT NAME: Eric Gold   : 1965    AGE: 59 y.o. DATE OF ENCOUNTER: 25    Provider:    MRN: [id number]      Subjective     Patient ID: Eric Gold is a 59 y.o. male.    Chief Complaint: Healthy You (Patient reports to the clinic today for [employer] Healthy You, he is fasting.) and Health Maintenance (Care gaps addressed, patient would like to have his RSV vaccine at [facility].//[facility])    History of Present Illness    HPI:  Patient reports doing well with diabetes management. Mounjaro was added to treatment regimen in January when A1C had climbed to 7.1%. He manages medication effectively, requesting a new supply when on the third dose of the four-dose box. He reports good glucose control and practicing portion control with diet. He has lost 21 lbs since starting Mounjaro in January. He has reduced Red Bull intake significantly. He reports regular bowel function but can manage any constipation with a stool softener if needed.    He denies any chest pain or side effects from medications.      ROS:  Eyes: -vision changes  Cardiovascular: -chest pain  Gastrointestinal: +constipation         Tobacco Use: High Risk (2025)    Patient History     Smoking Tobacco Use: Every Day     Smokeless Tobacco Use: Never     Passive Exposure: Not on file     Review of patient's allergies indicates:  No Known Allergies  Current Outpatient Medications   Medication Instructions    atorvastatin (LIPITOR) 20 mg, Oral, Daily    cyclobenzaprine (FLEXERIL) 10 mg, Oral, 3 times daily PRN    ibuprofen (ADVIL,MOTRIN) 800 mg, Oral, 3 times daily PRN, Take with food. Avoid all other NSAIDs.    lisinopriL-hydrochlorothiazide (PRINZIDE,ZESTORETIC) 20-12.5 mg per tablet 2 tablets, Oral, Daily    metFORMIN (GLUCOPHAGE-XR) 500 mg, Oral, With dinner    MOUNJARO 5 mg, Subcutaneous, Every 7 days  "    Medications Discontinued During This Encounter   Medication Reason    lisinopriL-hydrochlorothiazide (PRINZIDE,ZESTORETIC) 20-12.5 mg per tablet Reorder    atorvastatin (LIPITOR) 20 MG tablet Reorder    tirzepatide (MOUNJARO) 5 mg/0.5 mL PnIj Reorder    metFORMIN (GLUCOPHAGE-XR) 500 MG ER 24hr tablet Reorder       Past Medical History:   Diagnosis Date    Anxiety     Class 1 obesity due to excess calories with body mass index (BMI) of 32.0 to 32.9 in adult 9/8/2021    History of colon polyps 09/30/2020    Dr. Segovia, repeat 5 years    Hyperlipidemia     Hypertension     Kidney stone     Nicotine dependence, cigarettes, uncomplicated      Health Maintenance Topics with due status: Not Due       Topic Last Completion Date    Colorectal Cancer Screening 09/30/2020    TETANUS VACCINE 04/29/2024    Diabetes Urine Screening 01/15/2025    Foot Exam 01/15/2025    Lipid Panel 01/15/2025    Hemoglobin A1c 01/15/2025    Low Dose Statin 05/05/2025     Immunization History   Administered Date(s) Administered    COVID-19, MRNA, LN-S, PF (Pfizer) (Purple Cap) 02/12/2021, 03/19/2021, 10/14/2021    Influenza 10/01/2020, 10/14/2021    Influenza - Quadrivalent - PF *Preferred* (6 months and older) 10/27/2022    Influenza - Trivalent - Fluarix, Flulaval, Fluzone, Afluria - PF 11/13/2024    Pneumococcal Conjugate - 20 Valent 10/27/2022    Pneumococcal Polysaccharide - 23 Valent 09/08/2021    Tdap 02/18/2014, 04/29/2024    Zoster Recombinant 06/07/2023, 09/27/2023       Objective     Body mass index is 28.34 kg/m².  Wt Readings from Last 3 Encounters:   05/05/25 97.4 kg (214 lb 12.8 oz)   02/20/25 103.4 kg (228 lb)   01/15/25 106.6 kg (235 lb)     Ht Readings from Last 3 Encounters:   05/05/25 6' 1" (1.854 m)   02/20/25 6' 1" (1.854 m)   01/15/25 6' 1" (1.854 m)     BP Readings from Last 3 Encounters:   05/05/25 114/72   02/20/25 137/89   01/15/25 132/83     Temp Readings from Last 3 Encounters:   05/05/25 98.4 °F (36.9 °C) (Oral) "   02/20/25 97.8 °F (36.6 °C) (Oral)   01/15/25 97.9 °F (36.6 °C) (Oral)     Pulse Readings from Last 3 Encounters:   05/05/25 91   02/20/25 103   01/15/25 90     Resp Readings from Last 3 Encounters:   05/05/25 18   02/20/25 18   01/15/25 18     PF Readings from Last 3 Encounters:   No data found for PF       Physical Exam  Vitals and nursing note reviewed.   Constitutional:       General: He is not in acute distress.     Appearance: Normal appearance. He is not ill-appearing.   HENT:      Head: Normocephalic.      Right Ear: Tympanic membrane, ear canal and external ear normal.      Left Ear: Tympanic membrane, ear canal and external ear normal.      Nose: Nose normal.      Mouth/Throat:      Mouth: Mucous membranes are moist.      Pharynx: Oropharynx is clear. Uvula midline. No posterior oropharyngeal erythema or uvula swelling.   Eyes:      Conjunctiva/sclera: Conjunctivae normal.      Pupils: Pupils are equal, round, and reactive to light.   Neck:      Thyroid: No thyromegaly.      Vascular: Normal carotid pulses. No carotid bruit.   Cardiovascular:      Rate and Rhythm: Normal rate and regular rhythm.      Pulses: Normal pulses.      Heart sounds: Normal heart sounds.   Pulmonary:      Effort: Pulmonary effort is normal. No respiratory distress.      Breath sounds: Normal breath sounds. No wheezing, rhonchi or rales.   Abdominal:      Palpations: Abdomen is soft.      Tenderness: There is no abdominal tenderness.   Musculoskeletal:      Cervical back: Neck supple.      Right lower leg: No edema.      Left lower leg: No edema.   Lymphadenopathy:      Cervical: No cervical adenopathy.   Skin:     General: Skin is warm and dry.      Coloration: Skin is not jaundiced or pale.   Neurological:      General: No focal deficit present.      Mental Status: He is alert and oriented to person, place, and time.      Gait: Gait normal.   Psychiatric:         Mood and Affect: Mood normal.         Behavior: Behavior normal.          Assessment and Plan     1. Routine general medical examination at a health care facility    2. Diabetes mellitus screening  -     Comprehensive Metabolic Panel; Future; Expected date: 05/05/2025    3. Screening for hyperlipidemia  -     Lipid Panel; Future; Expected date: 05/05/2025    4. Type 2 diabetes mellitus with hyperglycemia, without long-term current use of insulin  Overview:  Prediabetes diagnosed October of 2022 with the A1c 5.9%.  Had progressively increasing A1c despite treatment with metformin but insurance to in the denied additional therapy with Ozempic due to A1c only 6.4% 04/29/2024.      Assessment & Plan:  Lab Results   Component Value Date    HGBA1C 7.1 (H) 01/15/2025    HGBA1C 6.4 04/29/2024    HGBA1C 6.0 09/27/2023     GLP-1 denied by insurance company in April 2024 due to A1c only 6.4%.  1/15/25 A1c progressed on metformin alone to 7.1%, Mounjaro approved    Continue Mounjaro 5 mg.  Resume metformin.  Patient will self schedule diabetic eye exam.    Orders:  -     Comprehensive Metabolic Panel; Future; Expected date: 05/05/2025  -     Hemoglobin A1C; Future; Expected date: 05/05/2025  -     metFORMIN (GLUCOPHAGE-XR) 500 MG ER 24hr tablet; Take 1 tablet (500 mg total) by mouth daily with dinner or evening meal.  Dispense: 90 tablet; Refill: 3  -     tirzepatide (MOUNJARO) 5 mg/0.5 mL PnIj; Inject 5 mg into the skin every 7 days.  Dispense: 2 mL; Refill: 5    5. Essential hypertension  Assessment & Plan:  Well controlled    Continue lisinopril 20/12 mg 2 daily.    Orders:  -     CBC Auto Differential; Future; Expected date: 05/05/2025  -     Comprehensive Metabolic Panel; Future; Expected date: 05/05/2025  -     lisinopriL-hydrochlorothiazide (PRINZIDE,ZESTORETIC) 20-12.5 mg per tablet; Take 2 tablets by mouth once daily.  Dispense: 180 tablet; Refill: 1    6. Mixed hyperlipidemia  -     Comprehensive Metabolic Panel; Future; Expected date: 05/05/2025  -     atorvastatin (LIPITOR) 20 MG  tablet; Take 1 tablet (20 mg total) by mouth once daily.  Dispense: 90 tablet; Refill: 3    7. Prostate cancer screening  -     PSA, Screening; Future; Expected date: 05/05/2025    8. Nicotine dependence, cigarettes, uncomplicated  Assessment & Plan:  Smoking cessation advised.  Declines LDCT lung cancer screen.         Plan:   Assessment & Plan    IMPRESSION:  - A1C climbed to 7.1% in January.  - Added Mounjaro 5 mg for glucose control, appetite suppression, and weight loss to improve diabetes management. Weight loss of 21 lbs since starting Mounjaro, positively impacting diabetes control. Considered maintaining current Mounjaro dosage as patient reports good appetite control and portion management.  - Considered discontinuation of Metformin if A1C drops significantly, while continuing Mounjaro.  - BP control excellent at 114/72.    ## TYPE 2 DIABETES MELLITUS:  - Monitored the patient's A1C which had climbed to 7.1% in January.  - A1C is expected to have improved since starting Mounjaro, though diabetes will remain controlled but not cured even if A1C drops.  - Continuing Metformin and Mounjaro (started in January) for diabetes management, with possibility of discontinuing Metformin if A1C drops sufficiently.  - Ordered diabetes-related labs including A1C test.  - Patient is practicing portion control and checking glucose a few times per week using test strips.  - Recommend continuing portion control and further reducing Red Bull consumption.  - Advised scheduling an eye exam, emphasizing the importance of regular eye exams for diabetic patients to monitor for retinal swelling that may occur without noticeable vision changes.  - Informed patient about potential constipation as a side effect of Mounjaro.    ## ESSENTIAL HYPERTENSION:  - Blood pressure is excellent at 114/72 and well-controlled.  - Continuing Losartan/HCTZ 25/12.5 mg for hypertension management.    ## HYPERLIPIDEMIA:  - Ordered lipid panel as part  of [facility] labs to evaluate cholesterol levels.    ## NICOTINE DEPENDENCE:  - Educated the patient that smoking status significantly increases risk of heart attack and stroke, especially with diabetes.    ## GENERAL HEALTH MAINTENANCE:  - Ordered [facility] labs and prostate cancer screening test.    ## FOLLOW-UP:  - Follow up in 6 months, unless lab results indicate need for earlier visit.  - Next year's wellness visit scheduled for 5/11/26, subject to change based on lab results.             I have reviewed the medications, allergies, and problem list.     Goal Actions:    What type of visit is the patient here for today?: Healthy You  Does the patient consent to enroll in [employer] Me Healthy?: No  Is this a Wellness Follow Up?: No  What is your overall wellness goal? (select at least one): Improve overall health, Lose weight  Choose 3: Biometric, Nutrition, Lifestyle  Biometric Actions: Attend regularly scheduled office visits  Lifestyle Actions : Take medications as prescribed  Nurtrition Actions: Eat heart healthy diet, drink 8-10 glasses of water per day      Follow up in about 6 months (around 11/5/2025) for T2DM, hypertension, Healthy You 1 yr with fasting labs, and follow-up as needed.    Signature:  AMY PrattBC    Future Appointments   Date Time Provider Department Center   11/6/2025 11:00 AM Blessing Gilliland FNP [facility]   5/11/2026  7:40 AM Blessing Gilliland FNP [facility]   This note was generated with the assistance of ambient listening technology. Verbal consent was obtained by the patient and accompanying visitor(s) for the recording of patient appointment to facilitate this note. I attest to having reviewed and edited the generated note for accuracy, though some syntax or spelling errors may persist. Please contact the author of this note for any clarification.

## 2025-05-05 NOTE — ASSESSMENT & PLAN NOTE
Lab Results   Component Value Date    HGBA1C 7.1 (H) 01/15/2025    HGBA1C 6.4 04/29/2024    HGBA1C 6.0 09/27/2023     GLP-1 denied by insurance company in April 2024 due to A1c only 6.4%.  1/15/25 A1c progressed on metformin alone to 7.1%, Mounjaro approved    Continue Mounjaro 5 mg.  Resume metformin.  Patient will self schedule diabetic eye exam.

## 2025-05-06 ENCOUNTER — PATIENT OUTREACH (OUTPATIENT)
Facility: HOSPITAL | Age: 60
End: 2025-05-06
Payer: COMMERCIAL

## 2025-05-06 NOTE — PROGRESS NOTES
Population Health Chart Review & Patient Outreach Details      Further Action Needed If Patient Returns Outreach:        Health Maintenance Due   Topic Date Due    Diabetic Eye Exam  Never done    LDCT Lung Screen  Never done    RSV Vaccine (Age 60+ and Pregnant patients) (1 - Risk 60-74 years 1-dose series) Never done    COVID-19 Vaccine (2024- season) 2024          Updates Requested / Reviewed:     []  Care Everywhere    []     []  External Sources ([facility], [facility], [facility], etc.)    [] [facility]   [] [facility]   [] Other:    []  Care Team Updated   []  Removed  or Duplicate Orders   []  Immunization Reconciliation Completed / Queried    [] [place]   [] [place]   [] [place]   [] [place]      Health Maintenance Topics Addressed and Outreach Outcomes / Actions Taken:             Breast Cancer Screening []  Mammogram Order Placed    []  Mammogram Screening Scheduled    []  External Records Requested & Care Team Updated if Applicable    []  External Records Uploaded & Care Team Updated if Applicable    []  Pt Declined Scheduling Mammogram    []  Pt Will Schedule with External Provider / Order Routed & Care Team Updated if Applicable              Cervical Cancer Screening []  Pap Smear Scheduled in Primary Care or OBGYN    []  External Records Requested & Care Team Updated if Applicable       []  External Records Uploaded, Care Team Updated, & History Updated if Applicable    []  Patient Declined Scheduling Pap Smear    []  Patient Will Schedule with External Provider & Care Team Updated if Applicable                  Colorectal Cancer Screening []  Colonoscopy Case Request / Referral / Home Test Order Placed    []  External Records Requested & Care Team Updated if Applicable    []  External Records Uploaded, Care Team Updated, & History Updated if Applicable    []  Patient Declined Completing Colon Cancer Screening    []  Patient Will Schedule with External Provider & Care Team Updated if  Applicable    []  Fit Kit Mailed (add the SmartPhrase under additional notes)    []  Reminded Patient to Complete Home Test                Diabetic Eye Exam []  Eye Exam Screening Order Placed    []  Eye Camera Scheduled or Optometry/Ophthalmology Referral Placed    []  External Records Requested & Care Team Updated if Applicable    []  External Records Uploaded, Care Team Updated, & History Updated if Applicable    []  Patient Declined Scheduling Eye Exam    []  Patient Will Schedule with External Provider & Care Team Updated if Applicable             Blood Pressure Control []  Primary Care Follow Up Visit Scheduled     []  Remote Blood Pressure Reading Captured    []  Patient Declined Remote Reading or Scheduling Appt - Escalated to PCP    []  Patient Will Call Back or Send Portal Message with Reading                 HbA1c & Other Labs []  Overdue Lab(s) Ordered    []  Overdue Lab(s) Scheduled    []  External Records Uploaded & Care Team Updated if Applicable    []  Primary Care Follow Up Visit Scheduled     []  Reminded Patient to Complete A1c Home Test    []  Patient Declined Scheduling Labs or Will Call Back to Schedule    []  Patient Will Schedule with External Provider / Order Routed, & Care Team Updated if Applicable           Primary Care Appointment []  Primary Care Appt Scheduled    []  Patient Declined Scheduling or Will Call Back to Schedule    []  Pt Established with External Provider, Updated Care Team, & Informed Pt to Notify Payor if Applicable           Medication Adherence /    Statin Use []  Primary Care Appointment Scheduled    []  Patient Reminded to  Prescription    []  Patient Declined, Provider Notified if Needed    []  Sent Provider Message to Review to Evaluate Pt for Statin, Add Exclusion Dx Codes, Document   Exclusion in Problem List, Change Statin Intensity Level to Moderate or High Intensity if Applicable                Osteoporosis Screening []  Dexa Order Placed    []  Dexa  Appointment Scheduled    []  External Records Requested & Care Team Updated    []  External Records Uploaded, Care Team Updated, & History Updated if Applicable    []  Patient Declined Scheduling Dexa or Will Call Back to Schedule    []  Patient Will Schedule with External Provider / Order Routed & Care Team Updated if Applicable       Additional Notes:       Post visit Population Health review of encounter with date of service  5/5/25 with Darshana.  All required HY components in encounter.    Followup appt for: 5/11/26 HY

## 2025-05-08 ENCOUNTER — RESULTS FOLLOW-UP (OUTPATIENT)
Dept: FAMILY MEDICINE | Facility: CLINIC | Age: 60
End: 2025-05-08

## 2025-06-04 LAB
LEFT EYE DM RETINOPATHY: NEGATIVE
RIGHT EYE DM RETINOPATHY: NEGATIVE

## 2025-08-29 ENCOUNTER — PATIENT MESSAGE (OUTPATIENT)
Facility: HOSPITAL | Age: 60
End: 2025-08-29
Payer: COMMERCIAL

## 2025-09-02 ENCOUNTER — PATIENT OUTREACH (OUTPATIENT)
Facility: HOSPITAL | Age: 60
End: 2025-09-02
Payer: COMMERCIAL

## 2025-09-04 ENCOUNTER — PATIENT OUTREACH (OUTPATIENT)
Facility: HOSPITAL | Age: 60
End: 2025-09-04
Payer: COMMERCIAL